# Patient Record
Sex: FEMALE | Race: WHITE | NOT HISPANIC OR LATINO | URBAN - METROPOLITAN AREA
[De-identification: names, ages, dates, MRNs, and addresses within clinical notes are randomized per-mention and may not be internally consistent; named-entity substitution may affect disease eponyms.]

---

## 2019-04-09 ENCOUNTER — OUTPATIENT (OUTPATIENT)
Dept: OUTPATIENT SERVICES | Facility: HOSPITAL | Age: 49
LOS: 1 days | Discharge: HOME | End: 2019-04-09

## 2019-04-09 DIAGNOSIS — G35 MULTIPLE SCLEROSIS: ICD-10-CM

## 2020-11-14 ENCOUNTER — TRANSCRIPTION ENCOUNTER (OUTPATIENT)
Age: 50
End: 2020-11-14

## 2020-11-29 ENCOUNTER — EMERGENCY (EMERGENCY)
Facility: HOSPITAL | Age: 50
LOS: 0 days | Discharge: HOME | End: 2020-11-29
Attending: EMERGENCY MEDICINE | Admitting: EMERGENCY MEDICINE
Payer: COMMERCIAL

## 2020-11-29 VITALS
SYSTOLIC BLOOD PRESSURE: 150 MMHG | DIASTOLIC BLOOD PRESSURE: 81 MMHG | HEART RATE: 82 BPM | TEMPERATURE: 97 F | WEIGHT: 160.06 LBS | HEIGHT: 66 IN | RESPIRATION RATE: 18 BRPM | OXYGEN SATURATION: 98 %

## 2020-11-29 DIAGNOSIS — S09.90XA UNSPECIFIED INJURY OF HEAD, INITIAL ENCOUNTER: ICD-10-CM
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
## 2020-11-29 DIAGNOSIS — S02.2XXA FRACTURE OF NASAL BONES, INITIAL ENCOUNTER FOR CLOSED FRACTURE: ICD-10-CM

## 2020-11-29 DIAGNOSIS — S00.01XA ABRASION OF SCALP, INITIAL ENCOUNTER: ICD-10-CM

## 2020-11-29 DIAGNOSIS — Z23 ENCOUNTER FOR IMMUNIZATION: ICD-10-CM
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
## 2020-11-29 DIAGNOSIS — R41.82 ALTERED MENTAL STATUS, UNSPECIFIED: ICD-10-CM

## 2020-11-29 DIAGNOSIS — Z79.899 OTHER LONG TERM (CURRENT) DRUG THERAPY: ICD-10-CM
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
## 2020-11-29 PROCEDURE — 21310: CPT

## 2020-11-29 PROCEDURE — 99284 EMERGENCY DEPT VISIT MOD MDM: CPT | Mod: 25

## 2020-11-29 PROCEDURE — 70160 X-RAY EXAM OF NASAL BONES: CPT | Mod: 26

## 2020-11-29 RX ORDER — TETANUS TOXOID, REDUCED DIPHTHERIA TOXOID AND ACELLULAR PERTUSSIS VACCINE, ADSORBED 5; 2.5; 8; 8; 2.5 [IU]/.5ML; [IU]/.5ML; UG/.5ML; UG/.5ML; UG/.5ML
0.5 SUSPENSION INTRAMUSCULAR ONCE
Refills: 0 | Status: COMPLETED | OUTPATIENT
Start: 2020-11-29 | End: 2020-11-29

## 2020-11-29 RX ORDER — SODIUM CHLORIDE 9 MG/ML
500 INJECTION INTRAMUSCULAR; INTRAVENOUS; SUBCUTANEOUS ONCE
Refills: 0 | Status: DISCONTINUED | OUTPATIENT
Start: 2020-11-29 | End: 2020-11-29

## 2020-11-29 RX ADMIN — TETANUS TOXOID, REDUCED DIPHTHERIA TOXOID AND ACELLULAR PERTUSSIS VACCINE, ADSORBED 0.5 MILLILITER(S): 5; 2.5; 8; 8; 2.5 SUSPENSION INTRAMUSCULAR at 12:50

## 2020-11-29 NOTE — ED PROVIDER NOTE - NSFOLLOWUPCLINICS_GEN_ALL_ED_FT
[facility]  ENT  [address], [address]nd [place] [address]  Phone: [phone]  Fax:   Follow Up Time:

## 2020-11-29 NOTE — ED PROVIDER NOTE - PROGRESS NOTE DETAILS
PA fellow note reviewed and agree, possible small nasal bone fracture, No deformity on exam. Dc'd home with ENT f/u, No CT needed

## 2020-11-29 NOTE — ED PROVIDER NOTE - OBJECTIVE STATEMENT
51 yo F pmh of MS presenting to the ED s/p mechanical fall 3 days ago sent in by [facility] for evaluation. Pt reports she had a few alcoholic beverages on Friday night and she tripped up her wooden steps landing directly on her face and has bruising to nose and under bilateral eyes. Pt also reports after that event she had another mechanical trip and fall landing on the right occipital region of her head and endorses she has a small open cut. Pt took Aleve this am, denies any active pain. Not on any anticoag. Pt denies LOC, neck pain, back pain, fever, chills, nausea, vomiting, abdominal pain, chest pain, sob, dizziness, blurry vision. 51 yo F pmh of MS presenting to the ED s/p mechanical fall 3 days ago sent in by [facility] for evaluation. Pt reports she had a few alcoholic beverages on Friday night and she tripped up her wooden steps landing directly on her face and has bruising to nose and under bilateral eyes. Pt also reports after that event she had another mechanical trip and fall landing on the right occipital region of her head and endorses she has a small open cut. Pt took Aleve this am, denies any active pain. Not on any anticoag. Pt denies LOC, headache, neck pain, back pain, fever, chills, nausea, vomiting, abdominal pain, chest pain, sob, dizziness, blurry vision.

## 2020-11-29 NOTE — ED PROVIDER NOTE - NS ED ROS FT
Constitutional: (-) fever (-) malaise (-) diaphoresis (-) chills (-) wt. loss (-) bodyaches   Eyes: (-) visual changes (-) eye pain   ENMT: (-) nasal or chest congestion (-) runny nose (-) sore throat (-) hoarseness  (-) hearing changes (-) ear pain (-) ear discharge or infections (-) neck pain (-) neck stiffness  Cardiac: (-) chest pain  (-) palpitations (-) syncope (-) edema  Respiratory: (-) cough (-) hemoptysis (-) history of asthma or COPD (-) SOB (-) OBRIEN  GI: (-) nausea (-) vomiting (-) diarrhea (-) abdominal pain (-) hematochezia (-) changes in appetite (-) hx of nephrolithiasis  : (-) dysuria (-) increased frequency  (-) hematuria (-) incontinence  MS: (-) back pain (-) myalgia (-) muscle weakness (-)  joint pain  Neuro: (-) headache (-) dizziness (-) numbness/tingling to extremities B/L (-) weakness (-) LOC (-) head injury (-) AMS (-) sadlde anesthesia (-) tremors  Skin: (-) rash (-) laceration  Psychiatric: (-) hallucinations (-) SI/HI (-) intoxication  GYN: (-) pelvic pain (-) abnormal bleeding (-) abnormal discharge or odor (-) hx of STDs (-) OCP use (-) hx of fibroids/cysts (-) hx of pregnancy (-) hx of mammo (>40)  LMP:  OB: (-)  (-) C/S (-) complications of delivery. GA:  Except as documented in the HPI, all other systems are negative. Constitutional: (-) fever (-) malaise (-) diaphoresis (-) chills (-) wt. loss (-) bodyaches   Eyes: (-) visual changes (-) eye pain   ENMT: (-) nasal or chest congestion (-) runny nose (-) sore throat (-) hoarseness  (-) hearing changes (-) ear pain (-) ear discharge or infections (-) neck pain (-) neck stiffness  Cardiac: (-) chest pain  (-) palpitations (-) syncope (-) edema  Respiratory: (-) cough (-) hemoptysis (-) SOB   GI: (-) nausea (-) vomiting (-) diarrhea (-) abdominal pain  MS: (-) back pain (-) myalgia (-) muscle weakness (-)  joint pain  Neuro: (-) headache (-) dizziness (-) numbness/tingling to extremities B/L (-) weakness (-) LOC (-) head injury (+) AMS   Skin: (-) rash (+) laceration  Psychiatric: (-) hallucinations (-) SI/HI (-) intoxication    Except as documented in the HPI, all other systems are negative.

## 2020-11-29 NOTE — ED ADULT NURSE NOTE - NSIMPLEMENTINTERV_GEN_ALL_ED
Implemented All Fall Risk Interventions:  [employer] to call system. Call bell, personal items and telephone within reach. Instruct patient to call for assistance. Room bathroom lighting operational. Non-slip footwear when patient is off stretcher. Physically safe environment: no spills, clutter or unnecessary equipment. Stretcher in lowest position, wheels locked, appropriate side rails in place. Provide visual cue, wrist band, yellow gown, etc. Monitor gait and stability. Monitor for mental status changes and reorient to person, place, and time. Review medications for side effects contributing to fall risk. Reinforce activity limits and safety measures with patient and family.

## 2020-11-29 NOTE — ED PROVIDER NOTE - PATIENT PORTAL LINK FT
You can access the FollowMyHealth Patient Portal offered by [facility] by registering at the following website: [email or web address]. By joining [facility]’s FollowMyHealth portal, you will also be able to view your health information using other applications (apps) compatible with our system.

## 2020-11-29 NOTE — ED PROVIDER NOTE - CLINICAL SUMMARY MEDICAL DECISION MAKING FREE TEXT BOX
50y female no blood thinners with nondisplaced nasal bone fx, no septal hematoma, perrla neuro intact, will d/c supportive care, ent/plastics f/u. Patient counseled regarding conditions which should prompt return.

## 2020-11-29 NOTE — ED PROVIDER NOTE - PHYSICAL EXAMINATION
GENERAL: Well-nourished, Well-developed. NAD.  HEAD: No visible palpable bumps or hematomas. No ecchymosis behind ears B/L. (+)small superficial scabbed healing abrasion right occipital region  Eyes: PERRLA, EOMI. No asymmetry. No nystagmus. No conjunctival injection. Non-icteric sclera.  ENMT: No pharyngeal erythema or exudates. Uvula midline. No oral lesions. No broken teeth. No septal hematoma. (+)swelling and bruising to nasal bridge, small superficial scabbed healing laceration. Nares patent.   Neck: Supple. No cervical midline TTP. No paraveretebral TTP to traps. FROM  CVS: No reproducible chest wall tenderness. Normal S1,S2. No murmurs appreciated on auscultation   RESP: No use of accessory muscles. Chest rise symmetrical with good expansion. Lungs clear to auscultation B/L. No wheezing, rales, or rhonchi auscultated.  GI: Soft, Nontender,Nondistended. No guarding or rebound tenderness. No CVAT B/L.  MSK: Extremities w/o deformity or ttp. No visible signs of trauma such as ecchymosis, erythema, or swelling. FROM of upper and lower extremities B/L. No midline spinal TTP. FROM of back with flexion and extension.  Skin: (+)small superficial scabbed healing abrasion right occipital region, no active bleeding.  (+) small superficial scabbed healing laceration to nasal bridge.  EXT: Radial and pedal pulses present B/L.   Neuro: AA&O x 3. Speaking in full sentences.Sensation grossly intact. Strength 5/5 B/L. Gait within normal limits.  Normal Finger to nose exam. Visual fields intact.

## 2020-11-29 NOTE — ED PROVIDER NOTE - NSFOLLOWUPINSTRUCTIONS_ED_ALL_ED_FT
Rest, Ice to nose, Motrin for pain. See ENT for follow up, Return to ED for worsening headache, dizziness, nausea and vomiting

## 2020-12-02 ENCOUNTER — TRANSCRIPTION ENCOUNTER (OUTPATIENT)
Age: 50
End: 2020-12-02

## 2021-04-22 ENCOUNTER — TRANSCRIPTION ENCOUNTER (OUTPATIENT)
Age: 51
End: 2021-04-22

## 2021-12-20 ENCOUNTER — TRANSCRIPTION ENCOUNTER (OUTPATIENT)
Age: 51
End: 2021-12-20

## 2021-12-23 ENCOUNTER — TRANSCRIPTION ENCOUNTER (OUTPATIENT)
Age: 51
End: 2021-12-23

## 2022-02-05 NOTE — ED PROVIDER NOTE - CHIEF COMPLAINT
The patient is a 50y Female complaining of fall. Mart-1 - Negative Histology Text: MART-1 staining demonstrates a normal density and pattern of melanocytes along the dermal-epidermal junction. The surgical margins are negative for tumor cells.

## 2022-06-23 ENCOUNTER — NON-APPOINTMENT (OUTPATIENT)
Age: 52
End: 2022-06-23

## 2022-07-05 ENCOUNTER — NON-APPOINTMENT (OUTPATIENT)
Age: 52
End: 2022-07-05

## 2022-07-31 ENCOUNTER — NON-APPOINTMENT (OUTPATIENT)
Age: 52
End: 2022-07-31

## 2022-09-21 ENCOUNTER — NON-APPOINTMENT (OUTPATIENT)
Age: 52
End: 2022-09-21

## 2022-09-26 ENCOUNTER — NON-APPOINTMENT (OUTPATIENT)
Age: 52
End: 2022-09-26

## 2023-01-17 ENCOUNTER — NON-APPOINTMENT (OUTPATIENT)
Age: 53
End: 2023-01-17

## 2023-06-08 ENCOUNTER — NON-APPOINTMENT (OUTPATIENT)
Age: 53
End: 2023-06-08

## 2023-06-13 ENCOUNTER — EMERGENCY (EMERGENCY)
Facility: HOSPITAL | Age: 53
LOS: 0 days | Discharge: HOME | End: 2023-06-13
Attending: EMERGENCY MEDICINE
Payer: COMMERCIAL

## 2023-06-13 VITALS
DIASTOLIC BLOOD PRESSURE: 83 MMHG | TEMPERATURE: 97 F | HEIGHT: 69 IN | WEIGHT: 225.09 LBS | SYSTOLIC BLOOD PRESSURE: 155 MMHG | RESPIRATION RATE: 19 BRPM | OXYGEN SATURATION: 98 % | HEART RATE: 63 BPM

## 2023-06-13 VITALS — SYSTOLIC BLOOD PRESSURE: 148 MMHG | DIASTOLIC BLOOD PRESSURE: 75 MMHG | HEART RATE: 65 BPM

## 2023-06-13 DIAGNOSIS — Z87.828 PERSONAL HISTORY OF OTHER (HEALED) PHYSICAL INJURY AND TRAUMA: ICD-10-CM

## 2023-06-13 DIAGNOSIS — I89.1 LYMPHANGITIS: ICD-10-CM

## 2023-06-13 DIAGNOSIS — F41.8 OTHER SPECIFIED ANXIETY DISORDERS: ICD-10-CM

## 2023-06-13 DIAGNOSIS — L53.9 ERYTHEMATOUS CONDITION, UNSPECIFIED: ICD-10-CM

## 2023-06-13 DIAGNOSIS — G35 MULTIPLE SCLEROSIS: ICD-10-CM

## 2023-06-13 LAB
ALBUMIN SERPL ELPH-MCNC: 4 G/DL — SIGNIFICANT CHANGE UP (ref 3.5–5.2)
ALP SERPL-CCNC: 115 U/L — SIGNIFICANT CHANGE UP (ref 30–115)
ALT FLD-CCNC: 52 U/L — HIGH (ref 0–41)
ANION GAP SERPL CALC-SCNC: 8 MMOL/L — SIGNIFICANT CHANGE UP (ref 7–14)
APTT BLD: 51.3 SEC — HIGH (ref 27–39.2)
AST SERPL-CCNC: 50 U/L — HIGH (ref 0–41)
BASOPHILS # BLD AUTO: 0.03 K/UL — SIGNIFICANT CHANGE UP (ref 0–0.2)
BASOPHILS NFR BLD AUTO: 0.5 % — SIGNIFICANT CHANGE UP (ref 0–1)
BILIRUB SERPL-MCNC: 0.6 MG/DL — SIGNIFICANT CHANGE UP (ref 0.2–1.2)
BUN SERPL-MCNC: 9 MG/DL — LOW (ref 10–20)
CALCIUM SERPL-MCNC: 9 MG/DL — SIGNIFICANT CHANGE UP (ref 8.4–10.5)
CHLORIDE SERPL-SCNC: 105 MMOL/L — SIGNIFICANT CHANGE UP (ref 98–110)
CO2 SERPL-SCNC: 25 MMOL/L — SIGNIFICANT CHANGE UP (ref 17–32)
CREAT SERPL-MCNC: 0.8 MG/DL — SIGNIFICANT CHANGE UP (ref 0.7–1.5)
EGFR: 88 ML/MIN/1.73M2 — SIGNIFICANT CHANGE UP
EOSINOPHIL # BLD AUTO: 0.1 K/UL — SIGNIFICANT CHANGE UP (ref 0–0.7)
EOSINOPHIL NFR BLD AUTO: 1.5 % — SIGNIFICANT CHANGE UP (ref 0–8)
GLUCOSE SERPL-MCNC: 100 MG/DL — HIGH (ref 70–99)
HCG SERPL QL: NEGATIVE — SIGNIFICANT CHANGE UP
HCT VFR BLD CALC: 47.1 % — HIGH (ref 37–47)
HGB BLD-MCNC: 15.3 G/DL — SIGNIFICANT CHANGE UP (ref 12–16)
IMM GRANULOCYTES NFR BLD AUTO: 0.6 % — HIGH (ref 0.1–0.3)
INR BLD: 1 RATIO — SIGNIFICANT CHANGE UP (ref 0.65–1.3)
LACTATE SERPL-SCNC: 0.8 MMOL/L — SIGNIFICANT CHANGE UP (ref 0.7–2)
LYMPHOCYTES # BLD AUTO: 1.98 K/UL — SIGNIFICANT CHANGE UP (ref 1.2–3.4)
LYMPHOCYTES # BLD AUTO: 30.1 % — SIGNIFICANT CHANGE UP (ref 20.5–51.1)
MCHC RBC-ENTMCNC: 31.7 PG — HIGH (ref 27–31)
MCHC RBC-ENTMCNC: 32.5 G/DL — SIGNIFICANT CHANGE UP (ref 32–37)
MCV RBC AUTO: 97.5 FL — SIGNIFICANT CHANGE UP (ref 81–99)
MONOCYTES # BLD AUTO: 0.3 K/UL — SIGNIFICANT CHANGE UP (ref 0.1–0.6)
MONOCYTES NFR BLD AUTO: 4.6 % — SIGNIFICANT CHANGE UP (ref 1.7–9.3)
NEUTROPHILS # BLD AUTO: 4.13 K/UL — SIGNIFICANT CHANGE UP (ref 1.4–6.5)
NEUTROPHILS NFR BLD AUTO: 62.7 % — SIGNIFICANT CHANGE UP (ref 42.2–75.2)
NRBC # BLD: 0 /100 WBCS — SIGNIFICANT CHANGE UP (ref 0–0)
PLATELET # BLD AUTO: 268 K/UL — SIGNIFICANT CHANGE UP (ref 130–400)
PMV BLD: 9 FL — SIGNIFICANT CHANGE UP (ref 7.4–10.4)
POTASSIUM SERPL-MCNC: 4.3 MMOL/L — SIGNIFICANT CHANGE UP (ref 3.5–5)
POTASSIUM SERPL-SCNC: 4.3 MMOL/L — SIGNIFICANT CHANGE UP (ref 3.5–5)
PROT SERPL-MCNC: 6.5 G/DL — SIGNIFICANT CHANGE UP (ref 6–8)
PROTHROM AB SERPL-ACNC: 11.4 SEC — SIGNIFICANT CHANGE UP (ref 9.95–12.87)
RBC # BLD: 4.83 M/UL — SIGNIFICANT CHANGE UP (ref 4.2–5.4)
RBC # FLD: 13.7 % — SIGNIFICANT CHANGE UP (ref 11.5–14.5)
SODIUM SERPL-SCNC: 138 MMOL/L — SIGNIFICANT CHANGE UP (ref 135–146)
WBC # BLD: 6.58 K/UL — SIGNIFICANT CHANGE UP (ref 4.8–10.8)
WBC # FLD AUTO: 6.58 K/UL — SIGNIFICANT CHANGE UP (ref 4.8–10.8)

## 2023-06-13 PROCEDURE — 85025 COMPLETE CBC W/AUTO DIFF WBC: CPT

## 2023-06-13 PROCEDURE — 36415 COLL VENOUS BLD VENIPUNCTURE: CPT

## 2023-06-13 PROCEDURE — 84703 CHORIONIC GONADOTROPIN ASSAY: CPT

## 2023-06-13 PROCEDURE — 93970 EXTREMITY STUDY: CPT

## 2023-06-13 PROCEDURE — 85610 PROTHROMBIN TIME: CPT

## 2023-06-13 PROCEDURE — 99284 EMERGENCY DEPT VISIT MOD MDM: CPT

## 2023-06-13 PROCEDURE — 83605 ASSAY OF LACTIC ACID: CPT

## 2023-06-13 PROCEDURE — 85730 THROMBOPLASTIN TIME PARTIAL: CPT

## 2023-06-13 PROCEDURE — 99284 EMERGENCY DEPT VISIT MOD MDM: CPT | Mod: 25

## 2023-06-13 PROCEDURE — 93970 EXTREMITY STUDY: CPT | Mod: 26

## 2023-06-13 PROCEDURE — 80053 COMPREHEN METABOLIC PANEL: CPT

## 2023-06-13 RX ORDER — ESCITALOPRAM OXALATE 10 MG/1
1 TABLET, FILM COATED ORAL
Qty: 0 | Refills: 0 | DISCHARGE

## 2023-06-13 RX ORDER — OCRELIZUMAB 300 MG/10ML
0 INJECTION INTRAVENOUS
Qty: 0 | Refills: 0 | DISCHARGE

## 2023-06-13 NOTE — ED PROVIDER NOTE - PHYSICAL EXAMINATION
VITAL SIGNS: I have reviewed nursing notes and confirm.  CONSTITUTIONAL: Well-developed; well-nourished; in no acute distress.   SKIN: Palpable cord to the left thigh with surrounding ecchymosis, tenderness to palpation over that affected area, no warmth or excessive redness, no fluctuance Remainder of skin exam is warm and dry, no acute rash.    HEAD: Normocephalic; atraumatic.  EYES: ; conjunctiva and sclera clear.  ENT: No nasal discharge; airway clear.  EXT: Normal ROM.  No clubbing, cyanosis or edema.   NEURO: Alert, oriented, grossly unremarkable

## 2023-06-13 NOTE — ED PROVIDER NOTE - ATTENDING APP SHARED VISIT CONTRIBUTION OF CARE
patient with exam more c/w thrombophlebitis though not supported by u/s (at least no clot), will d/c warm compress, switch antibiotic, pmd f/u. Patient counseled regarding conditions which should prompt return.

## 2023-06-13 NOTE — ED PROVIDER NOTE - PROGRESS NOTE DETAILS
Patient told to discontinue clindamycin and instead start taking Doxy which was sent to pharmacy.  Patient given strict return precautions.  Patient aware that Doxy makes her very sensitive to the sun and she should be careful.

## 2023-06-13 NOTE — ED ADULT NURSE NOTE - NSHOSCREENINGQ1_ED_ALL_ED
[General Appearance - Well Developed] : well developed [Normal Appearance] : normal appearance [Well Groomed] : well groomed [General Appearance - Well Nourished] : well nourished [No Deformities] : no deformities [General Appearance - In No Acute Distress] : no acute distress [Normal Jugular Venous V Waves Present] : normal jugular venous V waves present [Heart Rate And Rhythm] : heart rate and rhythm were normal [Heart Sounds] : normal S1 and S2 [Murmurs] : no murmurs present [Arterial Pulses Normal] : the arterial pulses were normal [Edema] : no peripheral edema present [Veins - Varicosity Changes] : no varicosital changes were noted in the lower extremities [Respiration, Rhythm And Depth] : normal respiratory rhythm and effort [Exaggerated Use Of Accessory Muscles For Inspiration] : no accessory muscle use [Auscultation Breath Sounds / Voice Sounds] : lungs were clear to auscultation bilaterally [Chest Palpation] : palpation of the chest revealed no abnormalities [Lungs Percussion] : the lungs were normal to percussion [Bowel Sounds] : normal bowel sounds [Abdomen Soft] : soft [Abdomen Tenderness] : non-tender [Abdomen Mass (___ Cm)] : no abdominal mass palpated [Abdomen Hernia] : no hernia was discovered [Abnormal Walk] : normal gait [Nail Clubbing] : no clubbing of the fingernails [Cyanosis, Localized] : no localized cyanosis [Petechial Hemorrhages (___cm)] : no petechial hemorrhages [Nail Splinter Hemorrhages] : no splinter hemorrhages of the nails [Skin Color & Pigmentation] : normal skin color and pigmentation No [Skin Turgor] : normal skin turgor [] : no rash [Oriented To Time, Place, And Person] : oriented to person, place, and time [Impaired Insight] : insight and judgment were intact [No Anxiety] : not feeling anxious [FreeTextEntry1] : S/P Rt BKA with prosthesis. Uses a cane.

## 2023-06-13 NOTE — ED ADULT NURSE NOTE - NSFALLUNIVINTERV_ED_ALL_ED
Bed/Stretcher in lowest position, wheels locked, appropriate side rails in place/Call bell, personal items and telephone in reach/Instruct patient to call for assistance before getting out of bed/chair/stretcher/Non-slip footwear applied when patient is off stretcher/[employer] to call system/Physically safe environment - no spills, clutter or unnecessary equipment/Purposeful proactive rounding/Room/bathroom lighting operational, light cord in reach

## 2023-06-13 NOTE — ED PROVIDER NOTE - NSFOLLOWUPINSTRUCTIONS_ED_ALL_ED_FT
Take antibiotics as discussed, be careful in the sun while taking this antibiotic.  Apply warm compress to area of redness

## 2023-06-13 NOTE — ED PROVIDER NOTE - OBJECTIVE STATEMENT
Patient is a 53-year-old female history of MS not on any current treatment, anxiety here for evaluation of left thigh redness after her dog accidentally scratched her leg last week.  Patient noticed some bruising to the area with pain was seen by urgent care few days ago and started on antibiotics for cellulitis, states pain is extending up her leg which prompted today's visit.  Patient denies fever, weakness, numbness

## 2023-06-13 NOTE — ED ADULT TRIAGE NOTE - CHIEF COMPLAINT QUOTE
Pt states " I went to urgent care on Friday 6/9 for cellulitis to my Left Leg. I was started on clinda 3x daily. The infection is spreading. I was told to come here if it worsened"

## 2023-06-13 NOTE — ED ADULT NURSE NOTE - NSFALLLASTSIX_ED_ALL_ED
cont. Wellbutrin
c/w levothyroxine 50mcg  -f/u AM TSH free T4- pending was not collected
c/w levothyroxine 50mcg  -f/u AM TSH free T4- pending was not collected
No.

## 2023-07-18 PROBLEM — F41.9 ANXIETY DISORDER, UNSPECIFIED: Chronic | Status: ACTIVE | Noted: 2023-06-13

## 2023-07-19 PROBLEM — Z00.00 ENCOUNTER FOR PREVENTIVE HEALTH EXAMINATION: Status: ACTIVE | Noted: 2023-07-19

## 2023-07-20 ENCOUNTER — APPOINTMENT (OUTPATIENT)
Dept: VASCULAR SURGERY | Facility: CLINIC | Age: 53
End: 2023-07-20
Payer: COMMERCIAL

## 2023-07-20 VITALS — DIASTOLIC BLOOD PRESSURE: 84 MMHG | SYSTOLIC BLOOD PRESSURE: 124 MMHG | HEART RATE: 69 BPM

## 2023-07-20 VITALS — HEIGHT: 69 IN | BODY MASS INDEX: 37.03 KG/M2 | WEIGHT: 250 LBS

## 2023-07-20 DIAGNOSIS — I83.893 VARICOSE VEINS OF BILATERAL LOWER EXTREMITIES WITH OTHER COMPLICATIONS: ICD-10-CM

## 2023-07-20 DIAGNOSIS — F41.9 ANXIETY DISORDER, UNSPECIFIED: ICD-10-CM

## 2023-07-20 DIAGNOSIS — Z87.891 PERSONAL HISTORY OF NICOTINE DEPENDENCE: ICD-10-CM

## 2023-07-20 DIAGNOSIS — Z78.9 OTHER SPECIFIED HEALTH STATUS: ICD-10-CM

## 2023-07-20 DIAGNOSIS — F32.A ANXIETY DISORDER, UNSPECIFIED: ICD-10-CM

## 2023-07-20 DIAGNOSIS — G35 MULTIPLE SCLEROSIS: ICD-10-CM

## 2023-07-20 PROCEDURE — 99204 OFFICE O/P NEW MOD 45 MIN: CPT

## 2023-07-20 PROCEDURE — 93970 EXTREMITY STUDY: CPT

## 2023-07-20 RX ORDER — ESCITALOPRAM OXALATE 20 MG/1
20 TABLET, FILM COATED ORAL
Refills: 0 | Status: ACTIVE | COMMUNITY

## 2023-07-20 RX ORDER — OMEGA-3/DHA/EPA/FISH OIL 300-1000MG
1000 CAPSULE ORAL
Refills: 0 | Status: ACTIVE | COMMUNITY

## 2023-07-20 RX ORDER — NICOTINE POLACRILEX 2 MG
1 GUM BUCCAL
Refills: 0 | Status: ACTIVE | COMMUNITY

## 2023-07-20 RX ORDER — ZINC SULFATE 50(220)MG
CAPSULE ORAL
Refills: 0 | Status: ACTIVE | COMMUNITY

## 2023-07-20 RX ORDER — UBIDECARENONE/VIT E ACET 100MG-5
CAPSULE ORAL
Refills: 0 | Status: ACTIVE | COMMUNITY

## 2023-07-20 NOTE — HISTORY OF PRESENT ILLNESS
[FreeTextEntry1] : 52 y/o F with h/o Multiple sclerosis, lower extremity varicose veins, recently seen at a vein clinic and diagnosed with phlebitis to left lower extremity, presents for evaluation of varicose veins. Has tried compression stockings in the past, but could not tolerate them.

## 2023-07-20 NOTE — DATA REVIEWED
[FreeTextEntry1] : I performed a venous duplex which was medically necessary to evaluate for venous reflux. It showed no evidence of DVT in the LE bilaterally, GSV positive for reflux bilaterally but short in length on left, phlebitis noted in the left thigh and calf varicosities, large incompetent varicosities bilaterally measuring 12 mm.\par

## 2023-07-20 NOTE — ASSESSMENT
[FreeTextEntry1] : 52 y/o F with symptomatic LE varicose veins, worse on RLE.\par \par She will require stab microphlebectomy for treatment, right leg first. Patient understands the risks of recurrence, bleeding, and infection.\par \par She is in agreement to proceed.\par

## 2023-12-12 ENCOUNTER — NON-APPOINTMENT (OUTPATIENT)
Age: 53
End: 2023-12-12

## 2024-05-29 ENCOUNTER — APPOINTMENT (OUTPATIENT)
Dept: ORTHOPEDIC SURGERY | Facility: CLINIC | Age: 54
End: 2024-05-29
Payer: SELF-PAY

## 2024-05-29 DIAGNOSIS — S80.01XA CONTUSION OF RIGHT KNEE, INITIAL ENCOUNTER: ICD-10-CM

## 2024-05-29 PROCEDURE — 99203 OFFICE O/P NEW LOW 30 MIN: CPT | Mod: ACP

## 2024-05-29 PROCEDURE — 73562 X-RAY EXAM OF KNEE 3: CPT | Mod: RT

## 2024-05-29 RX ORDER — MELOXICAM 15 MG/1
15 TABLET ORAL
Qty: 30 | Refills: 0 | Status: ACTIVE | COMMUNITY
Start: 2024-05-29 | End: 1900-01-01

## 2024-05-29 NOTE — DISCUSSION/SUMMARY
[de-identified] : Chief complaint: Right knee pain  HPI: Patient is a 54-year-old female presents the office today for the evaluation of right knee pain.  Patient reports that she was at work yesterday 5/28/2024 when she fell onto the right knee.  Since that time she has been experiencing pain to the right knee.  She is on sure of the orientation of the right knee upon the fall.  She reports having taken Aleve with no significant relief of her discomfort.  She reports applying ice to the right knee with some relief.  She denies any previous surgery or injury to the right knee.  She reports that she was able to walk immediately after the injury.  ROS: Positive for right knee pain  Physical examination of the right knee shows: No erythema  No ecchymosis  Tenderness to palpation over the anterior aspect of the knee as well as over the lateral aspect of the knee No appreciable effusion Able to perform active straight leg raise Knee flexion from 0-90 degrees Light touch is intact throughout mildly antalgic gait Negative Anterior Drawer No appreciable Instability with varus / valgus stress testing Calves are soft and nontender  3 views of the right knee performed in the office today show tricompartmental degenerative changes, no obvious acute displaced fracture, subluxation, or dislocation  Assessment/plan: Contusion of the right knee, discussed with the patient that knee contusions can take 4-6 weeks to heal, A prescription for meloxicam 15 mg once daily to be taken as needed with food was sent to the patient's pharmacy, confirmed no contraindications to NSAIDS. Patient denies being on a blood thinner. Denies history of GIB or GI ulcer.  Discussed in detail with the patient that they cannot take over-the-counter NSAIDs including but not limited to ibuprofen, Advil, Aleve, or Motrin while taking this medication.  They can continue to take over-the-counter Tylenol.  Patient can apply as needed ice or heat to the right knee with sensory precautions on an as-needed basis for pain, patient will be provided with a 3-week follow-up with SHAJI Webber, patient verbalized understanding of all findings in the office today, she agrees to follow-up as directed

## 2024-06-20 ENCOUNTER — APPOINTMENT (OUTPATIENT)
Dept: ORTHOPEDIC SURGERY | Facility: CLINIC | Age: 54
End: 2024-06-20
Payer: COMMERCIAL

## 2024-06-20 DIAGNOSIS — S80.00XD CONTUSION OF UNSPECIFIED KNEE, SUBSEQUENT ENCOUNTER: ICD-10-CM

## 2024-06-20 PROCEDURE — 99213 OFFICE O/P EST LOW 20 MIN: CPT

## 2024-06-20 NOTE — DISCUSSION/SUMMARY
[de-identified] : Right knee pain follow-up  HPI Patient 54-year-old female reports to office for subsequent reevaluation of right knee pain.  Pain has not improved.  Admits to knee swelling.  Walking, range of motion, and palpating certain areas aggravate the patient's pain.  Admits the knee clicks/gives out on her occasionally.  Denies any numbness or tingling.  Right knee exam is as follows: Mild effusion and infrapatellar bursitis noted.  No erythema or ecchymosis.  Able to perform active straight leg raise.  Knee flexion from 0 to 110 degrees with stiffness and pain.  Patellofemoral, anterior knee tenderness to palpation.  Calf is soft and nontender.  Positive Junior's.  Light touch intact throughout.  Mild antalgic gait.  Assessment/plan Explained contusion/bursitis and that she clinically may have a meniscal tear.  Right knee MRI ordered for further evaluation.  Patient was advised to call the office a few days after getting the MRI done to discuss results over the phone.   OTC NSAIDs as needed for pain.   The patient was advised to rest/ice the area and may alternate with warm compresses as needed. A script for physical therapy was printed for the patient so they can get started on that.    Follow-up in 2 months.  May consider injection at next visit if still symptomatic.  All questions/concerns were answered in detail.

## 2024-06-25 ENCOUNTER — NON-APPOINTMENT (OUTPATIENT)
Age: 54
End: 2024-06-25

## 2024-08-22 ENCOUNTER — APPOINTMENT (OUTPATIENT)
Dept: MRI IMAGING | Facility: CLINIC | Age: 54
End: 2024-08-22
Payer: COMMERCIAL

## 2024-08-22 PROCEDURE — 73721 MRI JNT OF LWR EXTRE W/O DYE: CPT | Mod: RT

## 2024-09-05 ENCOUNTER — APPOINTMENT (OUTPATIENT)
Dept: ORTHOPEDIC SURGERY | Facility: CLINIC | Age: 54
End: 2024-09-05
Payer: COMMERCIAL

## 2024-09-05 DIAGNOSIS — M17.11 UNILATERAL PRIMARY OSTEOARTHRITIS, RIGHT KNEE: ICD-10-CM

## 2024-09-05 DIAGNOSIS — S83.206A UNSPECIFIED TEAR OF UNSPECIFIED MENISCUS, CURRENT INJURY, RIGHT KNEE, INITIAL ENCOUNTER: ICD-10-CM

## 2024-09-05 PROCEDURE — 99213 OFFICE O/P EST LOW 20 MIN: CPT

## 2024-12-19 ENCOUNTER — APPOINTMENT (OUTPATIENT)
Dept: ORTHOPEDIC SURGERY | Facility: CLINIC | Age: 54
End: 2024-12-19

## 2025-05-14 ENCOUNTER — INPATIENT (INPATIENT)
Facility: HOSPITAL | Age: 55
LOS: 4 days | Discharge: HOME | DRG: 897 | End: 2025-05-19
Attending: STUDENT IN AN ORGANIZED HEALTH CARE EDUCATION/TRAINING PROGRAM | Admitting: STUDENT IN AN ORGANIZED HEALTH CARE EDUCATION/TRAINING PROGRAM
Payer: COMMERCIAL

## 2025-05-14 VITALS
HEIGHT: 69 IN | OXYGEN SATURATION: 97 % | DIASTOLIC BLOOD PRESSURE: 81 MMHG | RESPIRATION RATE: 20 BRPM | WEIGHT: 250 LBS | TEMPERATURE: 98 F | HEART RATE: 88 BPM | SYSTOLIC BLOOD PRESSURE: 131 MMHG

## 2025-05-14 DIAGNOSIS — R74.8 ABNORMAL LEVELS OF OTHER SERUM ENZYMES: ICD-10-CM

## 2025-05-14 DIAGNOSIS — Z98.891 HISTORY OF UTERINE SCAR FROM PREVIOUS SURGERY: Chronic | ICD-10-CM

## 2025-05-14 LAB
ALBUMIN SERPL ELPH-MCNC: 4.1 G/DL — SIGNIFICANT CHANGE UP (ref 3.5–5.2)
ALP SERPL-CCNC: 141 U/L — HIGH (ref 30–115)
ALT FLD-CCNC: 69 U/L — HIGH (ref 0–41)
ANION GAP SERPL CALC-SCNC: 12 MMOL/L — SIGNIFICANT CHANGE UP (ref 7–14)
ANION GAP SERPL CALC-SCNC: 21 MMOL/L — HIGH (ref 7–14)
AST SERPL-CCNC: 144 U/L — HIGH (ref 0–41)
BASOPHILS # BLD AUTO: 0.02 K/UL — SIGNIFICANT CHANGE UP (ref 0–0.2)
BASOPHILS # BLD AUTO: 0.03 K/UL — SIGNIFICANT CHANGE UP (ref 0–0.2)
BASOPHILS NFR BLD AUTO: 0.2 % — SIGNIFICANT CHANGE UP (ref 0–1)
BASOPHILS NFR BLD AUTO: 0.4 % — SIGNIFICANT CHANGE UP (ref 0–1)
BILIRUB DIRECT SERPL-MCNC: 0.9 MG/DL — HIGH (ref 0–0.3)
BILIRUB INDIRECT FLD-MCNC: 0.8 MG/DL — SIGNIFICANT CHANGE UP (ref 0.2–1.2)
BILIRUB SERPL-MCNC: 1.7 MG/DL — HIGH (ref 0.2–1.2)
BUN SERPL-MCNC: 8 MG/DL — LOW (ref 10–20)
BUN SERPL-MCNC: 9 MG/DL — LOW (ref 10–20)
CALCIUM SERPL-MCNC: 8.9 MG/DL — SIGNIFICANT CHANGE UP (ref 8.4–10.5)
CALCIUM SERPL-MCNC: 9.7 MG/DL — SIGNIFICANT CHANGE UP (ref 8.4–10.5)
CHLORIDE SERPL-SCNC: 91 MMOL/L — LOW (ref 98–110)
CHLORIDE SERPL-SCNC: 92 MMOL/L — LOW (ref 98–110)
CO2 SERPL-SCNC: 22 MMOL/L — SIGNIFICANT CHANGE UP (ref 17–32)
CO2 SERPL-SCNC: 25 MMOL/L — SIGNIFICANT CHANGE UP (ref 17–32)
CREAT SERPL-MCNC: 0.6 MG/DL — LOW (ref 0.7–1.5)
CREAT SERPL-MCNC: 0.7 MG/DL — SIGNIFICANT CHANGE UP (ref 0.7–1.5)
EGFR: 101 ML/MIN/1.73M2 — SIGNIFICANT CHANGE UP
EGFR: 101 ML/MIN/1.73M2 — SIGNIFICANT CHANGE UP
EGFR: 105 ML/MIN/1.73M2 — SIGNIFICANT CHANGE UP
EGFR: 105 ML/MIN/1.73M2 — SIGNIFICANT CHANGE UP
EOSINOPHIL # BLD AUTO: 0.02 K/UL — SIGNIFICANT CHANGE UP (ref 0–0.7)
EOSINOPHIL # BLD AUTO: 0.03 K/UL — SIGNIFICANT CHANGE UP (ref 0–0.7)
EOSINOPHIL NFR BLD AUTO: 0.3 % — SIGNIFICANT CHANGE UP (ref 0–8)
EOSINOPHIL NFR BLD AUTO: 0.3 % — SIGNIFICANT CHANGE UP (ref 0–8)
ETHANOL SERPL-MCNC: 59 MG/DL — HIGH
GLUCOSE SERPL-MCNC: 103 MG/DL — HIGH (ref 70–99)
GLUCOSE SERPL-MCNC: 112 MG/DL — HIGH (ref 70–99)
HCT VFR BLD CALC: 37.5 % — SIGNIFICANT CHANGE UP (ref 37–47)
HCT VFR BLD CALC: 42.5 % — SIGNIFICANT CHANGE UP (ref 37–47)
HGB BLD-MCNC: 13.4 G/DL — SIGNIFICANT CHANGE UP (ref 12–16)
HGB BLD-MCNC: 15.1 G/DL — SIGNIFICANT CHANGE UP (ref 12–16)
HYPOCHROMIA BLD QL: SLIGHT — SIGNIFICANT CHANGE UP
IMM GRANULOCYTES NFR BLD AUTO: 0.3 % — SIGNIFICANT CHANGE UP (ref 0.1–0.3)
IMM GRANULOCYTES NFR BLD AUTO: 0.4 % — HIGH (ref 0.1–0.3)
LYMPHOCYTES # BLD AUTO: 2.03 K/UL — SIGNIFICANT CHANGE UP (ref 1.2–3.4)
LYMPHOCYTES # BLD AUTO: 2.13 K/UL — SIGNIFICANT CHANGE UP (ref 1.2–3.4)
LYMPHOCYTES # BLD AUTO: 23.3 % — SIGNIFICANT CHANGE UP (ref 20.5–51.1)
LYMPHOCYTES # BLD AUTO: 27 % — SIGNIFICANT CHANGE UP (ref 20.5–51.1)
MACROCYTES BLD QL: SLIGHT — SIGNIFICANT CHANGE UP
MAGNESIUM SERPL-MCNC: 1.2 MG/DL — LOW (ref 1.8–2.4)
MCHC RBC-ENTMCNC: 35.5 G/DL — SIGNIFICANT CHANGE UP (ref 32–37)
MCHC RBC-ENTMCNC: 35.7 G/DL — SIGNIFICANT CHANGE UP (ref 32–37)
MCHC RBC-ENTMCNC: 39.3 PG — HIGH (ref 27–31)
MCHC RBC-ENTMCNC: 39.4 PG — HIGH (ref 27–31)
MCV RBC AUTO: 110 FL — HIGH (ref 81–99)
MCV RBC AUTO: 111 FL — HIGH (ref 81–99)
MONOCYTES # BLD AUTO: 0.37 K/UL — SIGNIFICANT CHANGE UP (ref 0.1–0.6)
MONOCYTES # BLD AUTO: 0.46 K/UL — SIGNIFICANT CHANGE UP (ref 0.1–0.6)
MONOCYTES NFR BLD AUTO: 4.7 % — SIGNIFICANT CHANGE UP (ref 1.7–9.3)
MONOCYTES NFR BLD AUTO: 5.3 % — SIGNIFICANT CHANGE UP (ref 1.7–9.3)
NEUTROPHILS # BLD AUTO: 5.3 K/UL — SIGNIFICANT CHANGE UP (ref 1.4–6.5)
NEUTROPHILS # BLD AUTO: 6.16 K/UL — SIGNIFICANT CHANGE UP (ref 1.4–6.5)
NEUTROPHILS NFR BLD AUTO: 67.2 % — SIGNIFICANT CHANGE UP (ref 42.2–75.2)
NEUTROPHILS NFR BLD AUTO: 70.6 % — SIGNIFICANT CHANGE UP (ref 42.2–75.2)
NRBC BLD AUTO-RTO: 0 /100 WBCS — SIGNIFICANT CHANGE UP (ref 0–0)
NRBC BLD AUTO-RTO: 0 /100 WBCS — SIGNIFICANT CHANGE UP (ref 0–0)
OVALOCYTES BLD QL SMEAR: SLIGHT — SIGNIFICANT CHANGE UP
PLAT MORPH BLD: NORMAL — SIGNIFICANT CHANGE UP
PLATELET # BLD AUTO: 131 K/UL — SIGNIFICANT CHANGE UP (ref 130–400)
PLATELET # BLD AUTO: 165 K/UL — SIGNIFICANT CHANGE UP (ref 130–400)
PLATELET COUNT - ESTIMATE: NORMAL — SIGNIFICANT CHANGE UP
PMV BLD: 8.9 FL — SIGNIFICANT CHANGE UP (ref 7.4–10.4)
PMV BLD: 9.6 FL — SIGNIFICANT CHANGE UP (ref 7.4–10.4)
POTASSIUM SERPL-MCNC: 4 MMOL/L — SIGNIFICANT CHANGE UP (ref 3.5–5)
POTASSIUM SERPL-MCNC: 4 MMOL/L — SIGNIFICANT CHANGE UP (ref 3.5–5)
POTASSIUM SERPL-SCNC: 4 MMOL/L — SIGNIFICANT CHANGE UP (ref 3.5–5)
POTASSIUM SERPL-SCNC: 4 MMOL/L — SIGNIFICANT CHANGE UP (ref 3.5–5)
PROT SERPL-MCNC: 6.3 G/DL — SIGNIFICANT CHANGE UP (ref 6–8)
RBC # BLD: 3.41 M/UL — LOW (ref 4.2–5.4)
RBC # BLD: 3.83 M/UL — LOW (ref 4.2–5.4)
RBC # FLD: 14.2 % — SIGNIFICANT CHANGE UP (ref 11.5–14.5)
RBC # FLD: 14.3 % — SIGNIFICANT CHANGE UP (ref 11.5–14.5)
RBC BLD AUTO: NORMAL — SIGNIFICANT CHANGE UP
SODIUM SERPL-SCNC: 129 MMOL/L — LOW (ref 135–146)
SODIUM SERPL-SCNC: 134 MMOL/L — LOW (ref 135–146)
STOMATOCYTES BLD QL SMEAR: SLIGHT — SIGNIFICANT CHANGE UP
WBC # BLD: 7.88 K/UL — SIGNIFICANT CHANGE UP (ref 4.8–10.8)
WBC # BLD: 8.73 K/UL — SIGNIFICANT CHANGE UP (ref 4.8–10.8)
WBC # FLD AUTO: 7.88 K/UL — SIGNIFICANT CHANGE UP (ref 4.8–10.8)
WBC # FLD AUTO: 8.73 K/UL — SIGNIFICANT CHANGE UP (ref 4.8–10.8)

## 2025-05-14 PROCEDURE — 81001 URINALYSIS AUTO W/SCOPE: CPT

## 2025-05-14 PROCEDURE — 80074 ACUTE HEPATITIS PANEL: CPT

## 2025-05-14 PROCEDURE — 80307 DRUG TEST PRSMV CHEM ANLYZR: CPT

## 2025-05-14 PROCEDURE — 80354 DRUG SCREENING FENTANYL: CPT

## 2025-05-14 PROCEDURE — 83735 ASSAY OF MAGNESIUM: CPT

## 2025-05-14 PROCEDURE — 82977 ASSAY OF GGT: CPT

## 2025-05-14 PROCEDURE — 85652 RBC SED RATE AUTOMATED: CPT

## 2025-05-14 PROCEDURE — 85025 COMPLETE CBC W/AUTO DIFF WBC: CPT

## 2025-05-14 PROCEDURE — 99285 EMERGENCY DEPT VISIT HI MDM: CPT

## 2025-05-14 PROCEDURE — 85027 COMPLETE CBC AUTOMATED: CPT

## 2025-05-14 PROCEDURE — 86038 ANTINUCLEAR ANTIBODIES: CPT

## 2025-05-14 PROCEDURE — 85610 PROTHROMBIN TIME: CPT

## 2025-05-14 PROCEDURE — 86140 C-REACTIVE PROTEIN: CPT

## 2025-05-14 PROCEDURE — 99222 1ST HOSP IP/OBS MODERATE 55: CPT

## 2025-05-14 PROCEDURE — 76705 ECHO EXAM OF ABDOMEN: CPT

## 2025-05-14 PROCEDURE — 80053 COMPREHEN METABOLIC PANEL: CPT

## 2025-05-14 PROCEDURE — 82140 ASSAY OF AMMONIA: CPT

## 2025-05-14 PROCEDURE — 82150 ASSAY OF AMYLASE: CPT

## 2025-05-14 PROCEDURE — 80048 BASIC METABOLIC PNL TOTAL CA: CPT

## 2025-05-14 PROCEDURE — 83036 HEMOGLOBIN GLYCOSYLATED A1C: CPT

## 2025-05-14 PROCEDURE — 36415 COLL VENOUS BLD VENIPUNCTURE: CPT

## 2025-05-14 PROCEDURE — 80346 BENZODIAZEPINES1-12: CPT

## 2025-05-14 RX ORDER — SODIUM CHLORIDE 9 G/1000ML
1000 INJECTION, SOLUTION INTRAVENOUS ONCE
Refills: 0 | Status: COMPLETED | OUTPATIENT
Start: 2025-05-14 | End: 2025-05-14

## 2025-05-14 RX ORDER — MAGNESIUM SULFATE 500 MG/ML
4 SYRINGE (ML) INJECTION ONCE
Refills: 0 | Status: COMPLETED | OUTPATIENT
Start: 2025-05-14 | End: 2025-05-14

## 2025-05-14 RX ORDER — CYST/ALA/Q10/PHOS.SER/DHA/BROC 100-20-50
1 POWDER (GRAM) ORAL DAILY
Refills: 0 | Status: DISCONTINUED | OUTPATIENT
Start: 2025-05-14 | End: 2025-05-19

## 2025-05-14 RX ORDER — LORAZEPAM 4 MG/ML
VIAL (ML) INJECTION
Refills: 0 | Status: COMPLETED | OUTPATIENT
Start: 2025-05-14 | End: 2025-05-18

## 2025-05-14 RX ORDER — ONDANSETRON HCL/PF 4 MG/2 ML
4 VIAL (ML) INJECTION EVERY 8 HOURS
Refills: 0 | Status: DISCONTINUED | OUTPATIENT
Start: 2025-05-14 | End: 2025-05-19

## 2025-05-14 RX ORDER — IBUPROFEN 200 MG
400 TABLET ORAL EVERY 6 HOURS
Refills: 0 | Status: DISCONTINUED | OUTPATIENT
Start: 2025-05-14 | End: 2025-05-19

## 2025-05-14 RX ORDER — MELATONIN 5 MG
3 TABLET ORAL AT BEDTIME
Refills: 0 | Status: DISCONTINUED | OUTPATIENT
Start: 2025-05-14 | End: 2025-05-19

## 2025-05-14 RX ORDER — CHLORDIAZEPOXIDE HCL 10 MG
50 CAPSULE ORAL ONCE
Refills: 0 | Status: DISCONTINUED | OUTPATIENT
Start: 2025-05-14 | End: 2025-05-14

## 2025-05-14 RX ORDER — LORAZEPAM 4 MG/ML
1.5 VIAL (ML) INJECTION EVERY 4 HOURS
Refills: 0 | Status: DISCONTINUED | OUTPATIENT
Start: 2025-05-15 | End: 2025-05-16

## 2025-05-14 RX ORDER — BISMUTH SUBSALICYLATE 262 MG/1
30 TABLET, CHEWABLE ORAL EVERY 6 HOURS
Refills: 0 | Status: DISCONTINUED | OUTPATIENT
Start: 2025-05-14 | End: 2025-05-19

## 2025-05-14 RX ORDER — LORAZEPAM 4 MG/ML
2 VIAL (ML) INJECTION EVERY 4 HOURS
Refills: 0 | Status: DISCONTINUED | OUTPATIENT
Start: 2025-05-14 | End: 2025-05-19

## 2025-05-14 RX ORDER — LORAZEPAM 4 MG/ML
1 VIAL (ML) INJECTION EVERY 4 HOURS
Refills: 0 | Status: DISCONTINUED | OUTPATIENT
Start: 2025-05-16 | End: 2025-05-17

## 2025-05-14 RX ORDER — ONDANSETRON HCL/PF 4 MG/2 ML
4 VIAL (ML) INJECTION EVERY 6 HOURS
Refills: 0 | Status: DISCONTINUED | OUTPATIENT
Start: 2025-05-14 | End: 2025-05-19

## 2025-05-14 RX ORDER — LORAZEPAM 4 MG/ML
0.5 VIAL (ML) INJECTION EVERY 4 HOURS
Refills: 0 | Status: DISCONTINUED | OUTPATIENT
Start: 2025-05-17 | End: 2025-05-18

## 2025-05-14 RX ORDER — ESCITALOPRAM OXALATE 20 MG/1
20 TABLET ORAL DAILY
Refills: 0 | Status: DISCONTINUED | OUTPATIENT
Start: 2025-05-14 | End: 2025-05-19

## 2025-05-14 RX ORDER — LORAZEPAM 4 MG/ML
2 VIAL (ML) INJECTION EVERY 4 HOURS
Refills: 0 | Status: DISCONTINUED | OUTPATIENT
Start: 2025-05-14 | End: 2025-05-15

## 2025-05-14 RX ORDER — HYDROXYZINE HYDROCHLORIDE 25 MG/1
50 TABLET, FILM COATED ORAL EVERY 6 HOURS
Refills: 0 | Status: DISCONTINUED | OUTPATIENT
Start: 2025-05-14 | End: 2025-05-19

## 2025-05-14 RX ORDER — FOLIC ACID 1 MG/1
1 TABLET ORAL DAILY
Refills: 0 | Status: DISCONTINUED | OUTPATIENT
Start: 2025-05-14 | End: 2025-05-19

## 2025-05-14 RX ORDER — SODIUM CHLORIDE 9 G/1000ML
1000 INJECTION, SOLUTION INTRAVENOUS
Refills: 0 | Status: DISCONTINUED | OUTPATIENT
Start: 2025-05-14 | End: 2025-05-19

## 2025-05-14 RX ORDER — HYDROXYZINE HYDROCHLORIDE 25 MG/1
100 TABLET, FILM COATED ORAL AT BEDTIME
Refills: 0 | Status: DISCONTINUED | OUTPATIENT
Start: 2025-05-14 | End: 2025-05-19

## 2025-05-14 RX ORDER — MAGNESIUM HYDROXIDE 400 MG/5ML
30 SUSPENSION ORAL ONCE
Refills: 0 | Status: DISCONTINUED | OUTPATIENT
Start: 2025-05-14 | End: 2025-05-19

## 2025-05-14 RX ORDER — ENOXAPARIN SODIUM 100 MG/ML
40 INJECTION SUBCUTANEOUS EVERY 24 HOURS
Refills: 0 | Status: DISCONTINUED | OUTPATIENT
Start: 2025-05-14 | End: 2025-05-19

## 2025-05-14 RX ORDER — MAGNESIUM, ALUMINUM HYDROXIDE 200-200 MG
30 TABLET,CHEWABLE ORAL EVERY 4 HOURS
Refills: 0 | Status: DISCONTINUED | OUTPATIENT
Start: 2025-05-14 | End: 2025-05-19

## 2025-05-14 RX ADMIN — Medication 2 MILLIGRAM(S): at 21:03

## 2025-05-14 RX ADMIN — Medication 50 MILLIGRAM(S): at 17:45

## 2025-05-14 RX ADMIN — SODIUM CHLORIDE 1000 MILLILITER(S): 9 INJECTION, SOLUTION INTRAVENOUS at 16:24

## 2025-05-14 RX ADMIN — SODIUM CHLORIDE 80 MILLILITER(S): 9 INJECTION, SOLUTION INTRAVENOUS at 21:02

## 2025-05-14 RX ADMIN — Medication 105 MILLIGRAM(S): at 17:09

## 2025-05-14 RX ADMIN — Medication 25 GRAM(S): at 17:22

## 2025-05-14 NOTE — H&P ADULT - NS ATTEND AMEND GEN_ALL_CORE FT
56 y/o female PMHx of MS, and alcohol dependence for almost two years.  Patient states she intermittently drinks 1/2 pint vodka and has increased dependence  w/ daily drinking for approx 2 months.   Patient states over past two weeks has developed tremors, denies black outs and denies w/d seizures.     # ETOH Dependence  - s/p Librium in ED  - ativan protocol  - Atarax PRN  - Thiamine & Folate   - Addiction medicine consult  - Hepatic function panel in am  - repeat CMP   - Long term rehab     # Hypomagnesemia  - IV Mg replaced in ED  - repeat Mg level at 2200  - Repeat Mg in am  - continue PO Mg     #MS, stable  - asymptomatic  - OP f/u  - Not currently on DMARDs

## 2025-05-14 NOTE — ED PROVIDER NOTE - PHYSICAL EXAMINATION
VITAL SIGNS: I have reviewed nursing notes and confirm.  CONSTITUTIONAL: chronically ill-appearing  SKIN: skin exam is warm and dry, no acute rash.    HEAD: Normocephalic; atraumatic.  EYES conjunctiva and sclera clear.  ENT: No nasal discharge; airway clear.  CARD: S1, S2 normal; no murmurs, gallops, or rubs. Regular rate and rhythm.   RESP: No wheezes, rales or rhonchi.  ABD: Normal bowel sounds; soft; non-distended; non-tender  EXT: Normal ROM.  No clubbing, cyanosis or edema.   NEURO: Alert, oriented, grossly unremarkable

## 2025-05-14 NOTE — ED PROVIDER NOTE - CLINICAL SUMMARY MEDICAL DECISION MAKING FREE TEXT BOX
We obtained labs.  Patient does have elevated LFTs, likely with alcohol related.  Magnesium was found to be low.  Repletion started in the ED.  While patient was in the ED she started to feel shaky and required a dose of Librium.  Will admit at this time.

## 2025-05-14 NOTE — ED ADULT TRIAGE NOTE - CHIEF COMPLAINT QUOTE
pt in er requesting detox from alcohol. pt stated she has been abusing alcohol for years, has been getting worse in the last few months  pts last drink 130 pm, drank vodka. has been drinking 3 drinks of vodka daily  pt complaining of dehydration and no appetite, pt denies SI/HI

## 2025-05-14 NOTE — ED ADULT NURSE NOTE - CCCP TRG CHIEF CMPLNT
detoxification Elidel Counseling: Patient may experience a mild burning sensation during topical application. Elidel is not approved in children less than 2 years of age. There have been case reports of hematologic and skin malignancies in patients using topical calcineurin inhibitors although causality is questionable.

## 2025-05-14 NOTE — ED PROVIDER NOTE - ATTENDING APP SHARED VISIT CONTRIBUTION OF CARE
55-year-old female past medical history of multiple sclerosis, alcohol abuse, presents for evaluation after drinking every day for the past 2 months.  Patient last drink was this afternoon.  Here requesting detox.  Patient states over the past few weeks she has been feeling shaky at times, no chest pain or shortness of breath, on exam patient NAD, AO x 3, no signs of trauma

## 2025-05-14 NOTE — ED PROVIDER NOTE - OBJECTIVE STATEMENT
Pt is a 56y/o female pmhx if MS, alsoholism, daily drinking for approx 2 months, last drink (Vodka) 130pm today here requesting detox. Pt denies withdrawal seizures, drug use, cp, sob, abd pain, n/v/d, SI/HI

## 2025-05-14 NOTE — ED PROVIDER NOTE - CARE PLAN
Principal Discharge DX:	Alcoholism  Secondary Diagnosis:	Hypomagnesemia  Secondary Diagnosis:	Abnormal transaminases   1

## 2025-05-14 NOTE — H&P ADULT - NSHPLABSRESULTS_GEN_ALL_CORE
15.1   8.73  )-----------( 165      ( 14 May 2025 16:05 )             42.5       05-14    134[L]  |  91[L]  |  8[L]  ----------------------------<  112[H]  4.0   |  22  |  0.7    Ca    9.7      14 May 2025 16:05  Mg     1.2     05-14    TPro  6.3  /  Alb  4.1  /  TBili  1.7[H]  /  DBili  0.9[H]  /  AST  144[H]  /  ALT  69[H]  /  AlkPhos  141[H]  05-14            Urinalysis Basic - ( 14 May 2025 16:05 )    Color: x / Appearance: x / SG: x / pH: x  Gluc: 112 mg/dL / Ketone: x  / Bili: x / Urobili: x   Blood: x / Protein: x / Nitrite: x   Leuk Esterase: x / RBC: x / WBC x   Sq Epi: x / Non Sq Epi: x / Bacteria: x

## 2025-05-14 NOTE — H&P ADULT - HISTORY OF PRESENT ILLNESS
Pt is a 54 y/o female PMHx of MS, and alcohol dependence for almost two years.  Patient states she intermittently drinks 1/2 pint vodka and has increased dependence  w/ daily drinking for approx 2 months.   Patient states over past two weeks has developed tremors, denies black outs and denies w/d seizures. Pt c/o nausea for past month with no vomiting and decreased PO intake. Pt states no other complaints. Patient states  last drink (Vodka) 130pm today here requesting detox. Pt denies drug use, Chest Pain, SOB, abd pain, denies SI/HI.

## 2025-05-14 NOTE — H&P ADULT - ASSESSMENT
Pt is a 54 y/o female PMHx of MS, and alcohol dependence for almost two years.  Patient states she intermittently drinks 1/2 pint vodka and has increased dependence  w/ daily drinking for approx 2 months.   Patient states over past two weeks has developed tremors, denies black outs and denies w/d seizures.         # ETOH Dependence  - s/p Librium in ED  - Librium protocol  - Atarax PRN  - Thiamine & Folate   - Addiction medicine consult  - Hepatic function panel in am  - repeat CMP   - Long term rehab     # Hypomagnesemia  - IV Mg replaced in ED  - repeat Mg level at 2200  - Repeat Mg in am  - continue PO Mg     # MS  - no specific follow up  - patient needs outpatient Neurology follow up

## 2025-05-14 NOTE — PATIENT PROFILE ADULT - HAVE YOU BEEN EATING POORLY BECAUSE OF A DECREASED APPETITE?
Established Patient    Chief Complaint   Patient presents with    Sleep Problem     Sleeping issues     HISTORY OF PRESENT ILLNESS:     Mr. Mason Craig is our 46 year old male patient with a past medical history that is significant for crohn's disease, hypertension, generalized anxiety disorder, major depressive disorder who presents to our outpatient clinic today for routine follow up.    Patient has not yet been able to establish with Psychiatry, but Psychiatry Urgent referral has been ordered previously; was told he is on the waiting list. No alarm symptoms today; no SI/HI.    Of note, patient self-continued Paroxetine 10mg daily after self-discontinuing Paroxetine at our prior visit. Initial plan was to keep off Paroxetine. Prescribing patient Paroxetine 5mg daily; to be taken for 3 weeks; 4th week to take every other day; 5th week to discontinue entirely. Advised patient he should not be self titrating his medication regimen because of risks and potential complications.    Keeping patient on Trazadone 150mg, with close follow up in 1 month to review labs and see how patient is doing with downtitrating SSRI; at that time, consider decreasing dose. Patient reluctant today because mentions lower dosages provided minimal symptom relief in the past.    Past Medical History:   Diagnosis Date    ADHD     Crohn's disease (HCC)     Hypertension     krones        Patient Active Problem List    Diagnosis Date Noted    Preventative health care 11/06/2023    Hypomania (HCC) 08/18/2023    Screening for metabolic disorder 08/15/2023    Erectile dysfunction 08/15/2023    Insomnia 12/05/2022    Encounter for routine adult health examination 12/05/2022    SHEY (generalized anxiety disorder) 08/20/2021    Crohn's disease (HCC) 11/04/2020    Essential hypertension 11/04/2020       Allergies:Patient has no known allergies.    Current Outpatient Medications   Medication Sig Dispense Refill    PARoxetine (PAXIL) 10 MG Tab Take 1  Tablet by mouth every day. Dose was changed from 20mg to 5mg. Patient should be taking 5mg daily. 15 Tablet 1    traZODone (DESYREL) 150 MG Tab TAKE 1 TABLET BY MOUTH AT BEDTIME 60 Tablet 0    busPIRone (BUSPAR) 10 MG Tab tablet TAKE 1/2 (ONE-HALF) TABLET BY MOUTH TWICE DAILY FOR 7 DAYS THEN TAKE 1 TABLET TWICE DAILY 90 Tablet 3    lisinopril-hydrochlorothiazide (PRINZIDE) 20-25 MG per tablet Take 1 tablet by mouth once daily 30 Tablet 3    cyclobenzaprine (FLEXERIL) 5 mg tablet Take 1 Tablet by mouth 2 times a day as needed for Muscle Spasms or Moderate Pain. 30 Tablet 0    Blood Pressure Monitoring (ADULT BLOOD PRESSURE CUFF LG) Kit 1 Kit 2 times a day as needed (Check Blood Pressure). (Patient not taking: Reported on 12/19/2023) 1 Kit 0     No current facility-administered medications for this visit.       Social History     Tobacco Use    Smoking status: Never    Smokeless tobacco: Never   Vaping Use    Vaping Use: Never used   Substance Use Topics    Alcohol use: Yes     Comment: occ     Drug use: No       Family History   Problem Relation Age of Onset    No Known Problems Mother     No Known Problems Father     Cancer Neg Hx     Diabetes Neg Hx     Hypertension Neg Hx     Hyperlipidemia Neg Hx     Stroke Neg Hx     Heart Disease Neg Hx          Review of Systems   Constitutional:  Negative for chills, fever and malaise/fatigue.   HENT: Negative.     Eyes: Negative.    Respiratory:  Negative for cough, hemoptysis, sputum production and shortness of breath.    Cardiovascular:  Negative for chest pain, palpitations and orthopnea.   Gastrointestinal:  Negative for abdominal pain, constipation, diarrhea, heartburn, nausea and vomiting.   Genitourinary:  Negative for dysuria, frequency and urgency.   Skin:  Negative for itching and rash.   Psychiatric/Behavioral:  Negative for depression, hallucinations, substance abuse and suicidal ideas. The patient is nervous/anxious and has insomnia.        Exam:  BP (!) 139/91  "(BP Location: Left arm, Patient Position: Sitting, BP Cuff Size: Large adult)   Pulse 86   Temp 36.1 °C (96.9 °F) (Temporal)   Ht 1.93 m (6' 4\")   Wt 114 kg (252 lb 6.4 oz)   SpO2 91%  Body mass index is 30.72 kg/m².    Constitutional:  Not in acute distress, well appearing.  HEENT:   Normocephalic, atraumatic.  Cardiovascular: S1, S2; Regular rate and rhythm; No murmurs/rubs/gallops.  Lungs:   Clear to auscultation bilaterally; No wheezes/rhales/rhonchi; No respiratory distress.  Abdomen: Soft, nondistended, not tender to palpation; no guarding no rigidity; no masses.  Extremities:  No cyanosis/clubbing/edema; No obvious deformities.  Skin:  Warm and dry.  No visible rashes.  Neurologic: Alert Awake & Oriented x 3, CN II-XII grossly intact; strength and sensation grossly intact.  No focal deficits noted.  Psychiatric:  Pressured speech, tangential    Assessment/Plan:   Mr. Mason Craig is our 46 year old male patient with a past medical history that is significant for crohn's disease, hypertension, generalized anxiety disorder, major depressive disorder who presents to our outpatient clinic today for routine follow up.    Screening for metabolic disorder  -Encouraged patient to please have his outpatient lab work done for review on subsequent clinic appointment    Hypomania (HCC)  -Still with pressured speech, racing thoughts, insomnia  -Improved tangential speech since decreased, then discontinued Paroxetine  -Continuing with Buspar 10mg BID  -Continuing with Trazadone 150mg qhs; consider downtitrating at subsequent visit once weaned off of SSRI and evaluating for symptom improvement  -Patient self-resumed Paroxetine 10mg daily; at this time, downtitrating to 5mg daily for 3 weeks, 4th week alternating days, 5th week off completely  -Pending appointment to establish with Psychiatry with urgent referral  -No alarm symptoms; denies SI/HI    Essential hypertension  -Blood pressure today 139/91  -Self reportedly " adherent to Lisinopril-HCTX 20-25mg  -Patient has not taken his pill yet today; takes at night; switching to mornings  -No slurred speech, facial droop, or pronator drift on exam; denies any chest pain, lightheadedness, or dizziness  -Monitor on subsequent clinic visit     All imaging results and lab results and consult notes are reviewed at this visit.  Followup: Return in about 4 weeks (around 1/16/2024).    Javier Bethea MD  PGY-3 Internal Medicine Resident   No (0)

## 2025-05-14 NOTE — H&P ADULT - NSHPPHYSICALEXAM_GEN_ALL_CORE
GENERAL:  56y/o Female NAD, resting comfortably.  HEAD:  Atraumatic, Normocephalic  EYES: EOMI, PERRLA, injected conjunctiva and  watery sclera clear  NECK: Supple, No JVD, no cervical lymphadenopathy, non-tender  CHEST/LUNG: Clear to auscultation bilaterally; No wheeze, rhonchi, or rales  HEART: Regular rate and rhythm; S1&S2  ABDOMEN: Soft, Nontender, Nondistended x 4 quadrants; Bowel sounds present  EXTREMITIES:   Peripheral Pulses Present, No clubbing, no cyanosis, or no edema, no calf tenderness  PSYCH: AAOx3, cooperative, appropriate  NEUROLOGY: WNL  SKIN: WNL

## 2025-05-14 NOTE — PATIENT PROFILE ADULT - FALL HARM RISK - RISK INTERVENTIONS

## 2025-05-15 DIAGNOSIS — F10.20 ALCOHOL DEPENDENCE, UNCOMPLICATED: ICD-10-CM

## 2025-05-15 LAB
A1C WITH ESTIMATED AVERAGE GLUCOSE RESULT: 4.9 % — SIGNIFICANT CHANGE UP (ref 4–5.6)
ALBUMIN SERPL ELPH-MCNC: 3.3 G/DL — LOW (ref 3.5–5.2)
ALP SERPL-CCNC: 108 U/L — SIGNIFICANT CHANGE UP (ref 30–115)
ALT FLD-CCNC: 51 U/L — HIGH (ref 0–41)
AMMONIA BLD-MCNC: 33 UMOL/L — SIGNIFICANT CHANGE UP (ref 11–55)
AMYLASE P1 CFR SERPL: 27 U/L — SIGNIFICANT CHANGE UP (ref 25–115)
ANION GAP SERPL CALC-SCNC: 11 MMOL/L — SIGNIFICANT CHANGE UP (ref 7–14)
AST SERPL-CCNC: 88 U/L — HIGH (ref 0–41)
BASOPHILS # BLD AUTO: 0.02 K/UL — SIGNIFICANT CHANGE UP (ref 0–0.2)
BASOPHILS NFR BLD AUTO: 0.4 % — SIGNIFICANT CHANGE UP (ref 0–1)
BILIRUB SERPL-MCNC: 1.8 MG/DL — HIGH (ref 0.2–1.2)
BUN SERPL-MCNC: 10 MG/DL — SIGNIFICANT CHANGE UP (ref 10–20)
CALCIUM SERPL-MCNC: 8.8 MG/DL — SIGNIFICANT CHANGE UP (ref 8.4–10.5)
CHLORIDE SERPL-SCNC: 98 MMOL/L — SIGNIFICANT CHANGE UP (ref 98–110)
CO2 SERPL-SCNC: 26 MMOL/L — SIGNIFICANT CHANGE UP (ref 17–32)
CREAT SERPL-MCNC: 0.6 MG/DL — LOW (ref 0.7–1.5)
EGFR: 105 ML/MIN/1.73M2 — SIGNIFICANT CHANGE UP
EGFR: 105 ML/MIN/1.73M2 — SIGNIFICANT CHANGE UP
EOSINOPHIL # BLD AUTO: 0.1 K/UL — SIGNIFICANT CHANGE UP (ref 0–0.7)
EOSINOPHIL NFR BLD AUTO: 2.1 % — SIGNIFICANT CHANGE UP (ref 0–8)
ESTIMATED AVERAGE GLUCOSE: 94 MG/DL — SIGNIFICANT CHANGE UP (ref 68–114)
GGT SERPL-CCNC: 216 U/L — HIGH (ref 1–40)
GLUCOSE SERPL-MCNC: 88 MG/DL — SIGNIFICANT CHANGE UP (ref 70–99)
HCT VFR BLD CALC: 37.7 % — SIGNIFICANT CHANGE UP (ref 37–47)
HGB BLD-MCNC: 13.2 G/DL — SIGNIFICANT CHANGE UP (ref 12–16)
IMM GRANULOCYTES NFR BLD AUTO: 0.2 % — SIGNIFICANT CHANGE UP (ref 0.1–0.3)
INR BLD: 0.95 RATIO — SIGNIFICANT CHANGE UP (ref 0.65–1.3)
LYMPHOCYTES # BLD AUTO: 1.2 K/UL — SIGNIFICANT CHANGE UP (ref 1.2–3.4)
LYMPHOCYTES # BLD AUTO: 24.7 % — SIGNIFICANT CHANGE UP (ref 20.5–51.1)
MAGNESIUM SERPL-MCNC: 2.1 MG/DL — SIGNIFICANT CHANGE UP (ref 1.8–2.4)
MCHC RBC-ENTMCNC: 35 G/DL — SIGNIFICANT CHANGE UP (ref 32–37)
MCHC RBC-ENTMCNC: 39.3 PG — HIGH (ref 27–31)
MCV RBC AUTO: 112.2 FL — HIGH (ref 81–99)
MONOCYTES # BLD AUTO: 0.23 K/UL — SIGNIFICANT CHANGE UP (ref 0.1–0.6)
MONOCYTES NFR BLD AUTO: 4.7 % — SIGNIFICANT CHANGE UP (ref 1.7–9.3)
NEUTROPHILS # BLD AUTO: 3.29 K/UL — SIGNIFICANT CHANGE UP (ref 1.4–6.5)
NEUTROPHILS NFR BLD AUTO: 67.9 % — SIGNIFICANT CHANGE UP (ref 42.2–75.2)
NRBC BLD AUTO-RTO: 0 /100 WBCS — SIGNIFICANT CHANGE UP (ref 0–0)
PLATELET # BLD AUTO: 114 K/UL — LOW (ref 130–400)
PMV BLD: 9.7 FL — SIGNIFICANT CHANGE UP (ref 7.4–10.4)
POTASSIUM SERPL-MCNC: 4 MMOL/L — SIGNIFICANT CHANGE UP (ref 3.5–5)
POTASSIUM SERPL-SCNC: 4 MMOL/L — SIGNIFICANT CHANGE UP (ref 3.5–5)
PROT SERPL-MCNC: 4.9 G/DL — LOW (ref 6–8)
PROTHROM AB SERPL-ACNC: 11.2 SEC — SIGNIFICANT CHANGE UP (ref 9.95–12.87)
RBC # BLD: 3.36 M/UL — LOW (ref 4.2–5.4)
RBC # FLD: 14.4 % — SIGNIFICANT CHANGE UP (ref 11.5–14.5)
SODIUM SERPL-SCNC: 135 MMOL/L — SIGNIFICANT CHANGE UP (ref 135–146)
WBC # BLD: 4.85 K/UL — SIGNIFICANT CHANGE UP (ref 4.8–10.8)
WBC # FLD AUTO: 4.85 K/UL — SIGNIFICANT CHANGE UP (ref 4.8–10.8)

## 2025-05-15 PROCEDURE — 99232 SBSQ HOSP IP/OBS MODERATE 35: CPT

## 2025-05-15 PROCEDURE — 99221 1ST HOSP IP/OBS SF/LOW 40: CPT

## 2025-05-15 RX ADMIN — Medication 1.5 MILLIGRAM(S): at 21:03

## 2025-05-15 RX ADMIN — Medication 2 MILLIGRAM(S): at 17:48

## 2025-05-15 RX ADMIN — Medication 100 MILLIGRAM(S): at 13:15

## 2025-05-15 RX ADMIN — Medication 1 TABLET(S): at 13:16

## 2025-05-15 RX ADMIN — Medication 2 MILLIGRAM(S): at 13:15

## 2025-05-15 RX ADMIN — Medication 2 MILLIGRAM(S): at 05:01

## 2025-05-15 RX ADMIN — ESCITALOPRAM OXALATE 20 MILLIGRAM(S): 20 TABLET ORAL at 13:15

## 2025-05-15 RX ADMIN — Medication 2 MILLIGRAM(S): at 02:04

## 2025-05-15 RX ADMIN — FOLIC ACID 1 MILLIGRAM(S): 1 TABLET ORAL at 13:15

## 2025-05-15 NOTE — CONSULT NOTE ADULT - SUBJECTIVE AND OBJECTIVE BOX
Pt is a 54 y/o female PMHx of MS, and alcohol dependence for almost two years.  Patient states she intermittently drinks 1/2 pint vodka and has increased dependence  w/ daily drinking for approx 2 months.   Patient states over past two weeks has developed tremors, denies black outs and denies w/d seizures. Pt c/o nausea for past month with no vomiting and decreased PO intake. Pt states no other complaints. Patient states  last drink (Vodka) 130pm today here requesting detox. Pt denies drug use, Chest Pain, SOB, abd pain, denies SI/HI    Pt interviewed, examined and EMR chart reviewed.  Pt admits to drinking 1/2 quart x  2  months. Last Drink day of admission   Hx of withdrawal tremors denies seizures  variable periods of sobriety in the past.  Has been in detox before ___X__yes,   _____No        REVIEW OF SYSTEMS:    Constitutional: No fever, weight loss or fatigue  ENT:  No difficulty hearing, tinnitus, vertigo; No sinus or throat pain  Neck: No pain or stiffness  Respiratory: No cough, wheezing, chills or hemoptysis  Cardiovascular: No chest pain, palpitations, shortness of breath, dizziness or leg swelling  Gastrointestinal: No abdominal or epigastric pain. No nausea, vomiting or hematemesis; No diarrhea or constipation. No melena or hematochezia.  Neurological: No headaches, memory loss, loss of strength, numbness or tremors  Musculoskeletal: No joint pain or swelling; No muscle, back or extremity pain  Psychiatric: No depression, anxiety, mood swings or difficulty sleeping    MEDICATIONS  (STANDING):  enoxaparin Injectable 40 milliGRAM(s) SubCutaneous every 24 hours  escitalopram 20 milliGRAM(s) Oral daily  folic acid 1 milliGRAM(s) Oral daily  lactated ringers. 1000 milliLiter(s) (80 mL/Hr) IV Continuous <Continuous>  LORazepam     Tablet   Oral   LORazepam     Tablet 2 milliGRAM(s) Oral every 4 hours  LORazepam     Tablet 1.5 milliGRAM(s) Oral every 4 hours  multivitamin/minerals 1 Tablet(s) Oral daily  thiamine 100 milliGRAM(s) Oral daily    MEDICATIONS  (PRN):  aluminum hydroxide/magnesium hydroxide/simethicone Suspension 30 milliLiter(s) Oral every 4 hours PRN Dyspepsia  bismuth subsalicylate Liquid 30 milliLiter(s) Oral every 6 hours PRN Diarrhea  hydrOXYzine hydrochloride 50 milliGRAM(s) Oral every 6 hours PRN Anxiety  hydrOXYzine hydrochloride 100 milliGRAM(s) Oral at bedtime PRN insomnia  ibuprofen  Tablet. 400 milliGRAM(s) Oral every 6 hours PRN Mild Pain (1 - 3)  LORazepam     Tablet 2 milliGRAM(s) Oral every 4 hours PRN Alcohol withdrawal symptoms  magnesium hydroxide Suspension 30 milliLiter(s) Oral once PRN Constipation  melatonin 3 milliGRAM(s) Oral at bedtime PRN Insomnia  ondansetron   Disintegrating Tablet 4 milliGRAM(s) Oral every 6 hours PRN Nausea and/or Vomiting  ondansetron Injectable 4 milliGRAM(s) IV Push every 8 hours PRN Nausea and/or Vomiting      Vital Signs Last 24 Hrs  T(C): 36.7 (15 May 2025 04:37), Max: 36.8 (14 May 2025 18:33)  T(F): 98.1 (15 May 2025 04:37), Max: 98.3 (14 May 2025 18:33)  HR: 94 (15 May 2025 04:37) (76 - 94)  BP: 130/83 (15 May 2025 04:37) (114/69 - 135/79)  BP(mean): --  RR: 18 (15 May 2025 04:37) (18 - 20)  SpO2: 98% (15 May 2025 04:37) (97% - 100%)    Parameters below as of 15 May 2025 04:37  Patient On (Oxygen Delivery Method): room air        PHYSICAL EXAM:    Constitutional: NAD,   HEENT: EOMI, Normal Hearing,  Respiratory: CTAB/L  Cardiovascular: RR  Gastrointestinal: soft, NT/ND  Extremities: No peripheral edema  Neurological: A/O x 3, no focal deficits    LABS:                        13.2   4.85  )-----------( 114      ( 15 May 2025 07:37 )             37.7     05-15    135  |  98  |  10  ----------------------------<  88  4.0   |  26  |  0.6[L]    Ca    8.8      15 May 2025 07:37  Mg     2.1     05-15    TPro  4.9[L]  /  Alb  3.3[L]  /  TBili  1.8[H]  /  DBili  x   /  AST  88[H]  /  ALT  51[H]  /  AlkPhos  108  05-15    PT/INR - ( 15 May 2025 07:37 )   PT: 11.20 sec;   INR: 0.95 ratio           Urinalysis Basic - ( 15 May 2025 07:37 )    Color: x / Appearance: x / SG: x / pH: x  Gluc: 88 mg/dL / Ketone: x  / Bili: x / Urobili: x   Blood: x / Protein: x / Nitrite: x   Leuk Esterase: x / RBC: x / WBC x   Sq Epi: x / Non Sq Epi: x / Bacteria: x      Drug Screen Urine:  Alcohol Level  Alcohol, Blood: 59 mg/dL (05-14-25 @ 16:05)        RADIOLOGY & ADDITIONAL STUDIES:

## 2025-05-15 NOTE — CONSULT NOTE ADULT - PROBLEM SELECTOR RECOMMENDATION 9
After evaluation at this time protocol .....Pt should be on Thiamine and Folic acid. Pt will be monitored and supportive care provided.  Obtain UDS if not done already.  Use of Vistaril for anxiety.  Consider adding gabapentin for anxiety standing order and follow up with PMD/Program for continuation of treatment.    Abdominal ultrasound AFP every 6 months for HCC screening  -EGD outpatient for esophageal varices screening   -Follow up with Private GI or our [facility] located at [address] [place] [address]. Phone Number: [phone]  -Alcohol counseling provided   CATCH team involved for aftercare and pt will follow up with aftercare

## 2025-05-15 NOTE — PROGRESS NOTE ADULT - ASSESSMENT
56 y/o female PMHx of MS, and alcohol dependence for almost two years.  Patient states she intermittently drinks 1/2 pint vodka and has increased dependence  w/ daily drinking for approx 2 months.   Patient states over past two weeks has developed tremors, denies black outs and denies w/d seizures.     # ETOH Dependence  - s/p Librium in ED  - ativan protocol  - Atarax PRN  - Thiamine & Folate   - Addiction medicine following  - UDS  - Long term rehab     # Hypomagnesemia  - IV Mg replaced in ED  - repeat Mg level at 2200  - Repeat Mg in am  - continue PO Mg     #MS, stable  - asymptomatic  - OP f/u  - Not currently on DMARDs.

## 2025-05-16 LAB
ALBUMIN SERPL ELPH-MCNC: 3.1 G/DL — LOW (ref 3.5–5.2)
ALP SERPL-CCNC: 104 U/L — SIGNIFICANT CHANGE UP (ref 30–115)
ALT FLD-CCNC: 62 U/L — HIGH (ref 0–41)
ANION GAP SERPL CALC-SCNC: 11 MMOL/L — SIGNIFICANT CHANGE UP (ref 7–14)
APPEARANCE UR: ABNORMAL
AST SERPL-CCNC: 145 U/L — HIGH (ref 0–41)
BASOPHILS # BLD AUTO: 0.02 K/UL — SIGNIFICANT CHANGE UP (ref 0–0.2)
BASOPHILS NFR BLD AUTO: 0.5 % — SIGNIFICANT CHANGE UP (ref 0–1)
BILIRUB SERPL-MCNC: 1 MG/DL — SIGNIFICANT CHANGE UP (ref 0.2–1.2)
BILIRUB UR-MCNC: ABNORMAL
BUN SERPL-MCNC: 10 MG/DL — SIGNIFICANT CHANGE UP (ref 10–20)
CALCIUM SERPL-MCNC: 8.3 MG/DL — LOW (ref 8.4–10.5)
CHLORIDE SERPL-SCNC: 102 MMOL/L — SIGNIFICANT CHANGE UP (ref 98–110)
CO2 SERPL-SCNC: 26 MMOL/L — SIGNIFICANT CHANGE UP (ref 17–32)
COLOR SPEC: SIGNIFICANT CHANGE UP
CREAT SERPL-MCNC: 0.5 MG/DL — LOW (ref 0.7–1.5)
DIFF PNL FLD: NEGATIVE — SIGNIFICANT CHANGE UP
DRUG SCREEN 1, URINE RESULT: SIGNIFICANT CHANGE UP
EGFR: 110 ML/MIN/1.73M2 — SIGNIFICANT CHANGE UP
EGFR: 110 ML/MIN/1.73M2 — SIGNIFICANT CHANGE UP
EOSINOPHIL # BLD AUTO: 0.1 K/UL — SIGNIFICANT CHANGE UP (ref 0–0.7)
EOSINOPHIL NFR BLD AUTO: 2.3 % — SIGNIFICANT CHANGE UP (ref 0–8)
GLUCOSE SERPL-MCNC: 95 MG/DL — SIGNIFICANT CHANGE UP (ref 70–99)
GLUCOSE UR QL: NEGATIVE MG/DL — SIGNIFICANT CHANGE UP
HAV IGM SER-ACNC: SIGNIFICANT CHANGE UP
HBV CORE IGM SER-ACNC: SIGNIFICANT CHANGE UP
HBV SURFACE AG SER-ACNC: SIGNIFICANT CHANGE UP
HCT VFR BLD CALC: 37 % — SIGNIFICANT CHANGE UP (ref 37–47)
HCV AB S/CO SERPL IA: 0.08 S/CO — SIGNIFICANT CHANGE UP (ref 0–0.79)
HCV AB SERPL-IMP: SIGNIFICANT CHANGE UP
HGB BLD-MCNC: 12.6 G/DL — SIGNIFICANT CHANGE UP (ref 12–16)
IMM GRANULOCYTES NFR BLD AUTO: 0.2 % — SIGNIFICANT CHANGE UP (ref 0.1–0.3)
KETONES UR QL: NEGATIVE MG/DL — SIGNIFICANT CHANGE UP
LEUKOCYTE ESTERASE UR-ACNC: ABNORMAL
LYMPHOCYTES # BLD AUTO: 1.51 K/UL — SIGNIFICANT CHANGE UP (ref 1.2–3.4)
LYMPHOCYTES # BLD AUTO: 34.6 % — SIGNIFICANT CHANGE UP (ref 20.5–51.1)
MAGNESIUM SERPL-MCNC: 1.6 MG/DL — LOW (ref 1.8–2.4)
MCHC RBC-ENTMCNC: 34.1 G/DL — SIGNIFICANT CHANGE UP (ref 32–37)
MCHC RBC-ENTMCNC: 39 PG — HIGH (ref 27–31)
MCV RBC AUTO: 114.6 FL — HIGH (ref 81–99)
MONOCYTES # BLD AUTO: 0.18 K/UL — SIGNIFICANT CHANGE UP (ref 0.1–0.6)
MONOCYTES NFR BLD AUTO: 4.1 % — SIGNIFICANT CHANGE UP (ref 1.7–9.3)
NEUTROPHILS # BLD AUTO: 2.54 K/UL — SIGNIFICANT CHANGE UP (ref 1.4–6.5)
NEUTROPHILS NFR BLD AUTO: 58.3 % — SIGNIFICANT CHANGE UP (ref 42.2–75.2)
NITRITE UR-MCNC: NEGATIVE — SIGNIFICANT CHANGE UP
NRBC BLD AUTO-RTO: 0 /100 WBCS — SIGNIFICANT CHANGE UP (ref 0–0)
PH UR: 7 — SIGNIFICANT CHANGE UP (ref 5–8)
PLATELET # BLD AUTO: 96 K/UL — LOW (ref 130–400)
PMV BLD: 9.4 FL — SIGNIFICANT CHANGE UP (ref 7.4–10.4)
POTASSIUM SERPL-MCNC: 4.2 MMOL/L — SIGNIFICANT CHANGE UP (ref 3.5–5)
POTASSIUM SERPL-SCNC: 4.2 MMOL/L — SIGNIFICANT CHANGE UP (ref 3.5–5)
PROT SERPL-MCNC: 4.8 G/DL — LOW (ref 6–8)
PROT UR-MCNC: NEGATIVE MG/DL — SIGNIFICANT CHANGE UP
RBC # BLD: 3.23 M/UL — LOW (ref 4.2–5.4)
RBC # FLD: 14.4 % — SIGNIFICANT CHANGE UP (ref 11.5–14.5)
SODIUM SERPL-SCNC: 139 MMOL/L — SIGNIFICANT CHANGE UP (ref 135–146)
SP GR SPEC: 1.01 — SIGNIFICANT CHANGE UP (ref 1–1.03)
UROBILINOGEN FLD QL: 2 MG/DL (ref 0.2–1)
WBC # BLD: 4.36 K/UL — LOW (ref 4.8–10.8)
WBC # FLD AUTO: 4.36 K/UL — LOW (ref 4.8–10.8)

## 2025-05-16 PROCEDURE — 99232 SBSQ HOSP IP/OBS MODERATE 35: CPT

## 2025-05-16 PROCEDURE — 99231 SBSQ HOSP IP/OBS SF/LOW 25: CPT

## 2025-05-16 RX ADMIN — Medication 1.5 MILLIGRAM(S): at 13:11

## 2025-05-16 RX ADMIN — ESCITALOPRAM OXALATE 20 MILLIGRAM(S): 20 TABLET ORAL at 13:12

## 2025-05-16 RX ADMIN — SODIUM CHLORIDE 80 MILLILITER(S): 9 INJECTION, SOLUTION INTRAVENOUS at 21:25

## 2025-05-16 RX ADMIN — Medication 1.5 MILLIGRAM(S): at 05:10

## 2025-05-16 RX ADMIN — FOLIC ACID 1 MILLIGRAM(S): 1 TABLET ORAL at 13:12

## 2025-05-16 RX ADMIN — Medication 100 MILLIGRAM(S): at 13:12

## 2025-05-16 RX ADMIN — Medication 1.5 MILLIGRAM(S): at 17:26

## 2025-05-16 RX ADMIN — Medication 1 MILLIGRAM(S): at 21:25

## 2025-05-16 RX ADMIN — Medication 1.5 MILLIGRAM(S): at 09:52

## 2025-05-16 RX ADMIN — Medication 1 TABLET(S): at 13:11

## 2025-05-16 NOTE — PROGRESS NOTE ADULT - ASSESSMENT
56 y/o female PMHx of MS, and alcohol dependence for almost two years.  Patient states she intermittently drinks 1/2 pint vodka and has increased dependence  w/ daily drinking for approx 2 months.   Patient states over past two weeks has developed tremors, denies black outs and denies w/d seizures.     # ETOH Dependence  - s/p Librium in ED  - ativan protocol  - Atarax PRN  - Thiamine & Folate   - Addiction medicine following  - Long term rehab     # Hypomagnesemia  - IV Mg replaced in ED  - repeat Mg level at 2200  - continue PO Mg     #MS, stable  - asymptomatic  - OP f/u  - Not currently on DMARDs.

## 2025-05-17 LAB
ALBUMIN SERPL ELPH-MCNC: 3.1 G/DL — LOW (ref 3.5–5.2)
ALP SERPL-CCNC: 113 U/L — SIGNIFICANT CHANGE UP (ref 30–115)
ALT FLD-CCNC: 110 U/L — HIGH (ref 0–41)
ANION GAP SERPL CALC-SCNC: 14 MMOL/L — SIGNIFICANT CHANGE UP (ref 7–14)
AST SERPL-CCNC: 246 U/L — HIGH (ref 0–41)
BASOPHILS # BLD AUTO: 0.03 K/UL — SIGNIFICANT CHANGE UP (ref 0–0.2)
BASOPHILS NFR BLD AUTO: 0.7 % — SIGNIFICANT CHANGE UP (ref 0–1)
BILIRUB SERPL-MCNC: 0.8 MG/DL — SIGNIFICANT CHANGE UP (ref 0.2–1.2)
BUN SERPL-MCNC: 6 MG/DL — LOW (ref 10–20)
CALCIUM SERPL-MCNC: 9.1 MG/DL — SIGNIFICANT CHANGE UP (ref 8.4–10.5)
CHLORIDE SERPL-SCNC: 103 MMOL/L — SIGNIFICANT CHANGE UP (ref 98–110)
CO2 SERPL-SCNC: 23 MMOL/L — SIGNIFICANT CHANGE UP (ref 17–32)
CREAT SERPL-MCNC: 0.6 MG/DL — LOW (ref 0.7–1.5)
CRP SERPL-MCNC: 11.3 MG/L — HIGH
EGFR: 105 ML/MIN/1.73M2 — SIGNIFICANT CHANGE UP
EGFR: 105 ML/MIN/1.73M2 — SIGNIFICANT CHANGE UP
EOSINOPHIL # BLD AUTO: 0.16 K/UL — SIGNIFICANT CHANGE UP (ref 0–0.7)
EOSINOPHIL NFR BLD AUTO: 3.7 % — SIGNIFICANT CHANGE UP (ref 0–8)
ERYTHROCYTE [SEDIMENTATION RATE] IN BLOOD: 9 MM/HR — SIGNIFICANT CHANGE UP (ref 0–20)
GLUCOSE SERPL-MCNC: 123 MG/DL — HIGH (ref 70–99)
HCT VFR BLD CALC: 38.9 % — SIGNIFICANT CHANGE UP (ref 37–47)
HGB BLD-MCNC: 13.2 G/DL — SIGNIFICANT CHANGE UP (ref 12–16)
IMM GRANULOCYTES NFR BLD AUTO: 0.7 % — HIGH (ref 0.1–0.3)
LYMPHOCYTES # BLD AUTO: 1.67 K/UL — SIGNIFICANT CHANGE UP (ref 1.2–3.4)
LYMPHOCYTES # BLD AUTO: 38.6 % — SIGNIFICANT CHANGE UP (ref 20.5–51.1)
MAGNESIUM SERPL-MCNC: 1.7 MG/DL — LOW (ref 1.8–2.4)
MCHC RBC-ENTMCNC: 33.9 G/DL — SIGNIFICANT CHANGE UP (ref 32–37)
MCHC RBC-ENTMCNC: 39.3 PG — HIGH (ref 27–31)
MCV RBC AUTO: 115.8 FL — HIGH (ref 81–99)
MONOCYTES # BLD AUTO: 0.23 K/UL — SIGNIFICANT CHANGE UP (ref 0.1–0.6)
MONOCYTES NFR BLD AUTO: 5.3 % — SIGNIFICANT CHANGE UP (ref 1.7–9.3)
NEUTROPHILS # BLD AUTO: 2.21 K/UL — SIGNIFICANT CHANGE UP (ref 1.4–6.5)
NEUTROPHILS NFR BLD AUTO: 51 % — SIGNIFICANT CHANGE UP (ref 42.2–75.2)
NRBC BLD AUTO-RTO: 0 /100 WBCS — SIGNIFICANT CHANGE UP (ref 0–0)
PLATELET # BLD AUTO: 111 K/UL — LOW (ref 130–400)
PMV BLD: 8.7 FL — SIGNIFICANT CHANGE UP (ref 7.4–10.4)
POTASSIUM SERPL-MCNC: 4 MMOL/L — SIGNIFICANT CHANGE UP (ref 3.5–5)
POTASSIUM SERPL-SCNC: 4 MMOL/L — SIGNIFICANT CHANGE UP (ref 3.5–5)
PROT SERPL-MCNC: 5.2 G/DL — LOW (ref 6–8)
RBC # BLD: 3.36 M/UL — LOW (ref 4.2–5.4)
RBC # FLD: 14.3 % — SIGNIFICANT CHANGE UP (ref 11.5–14.5)
SODIUM SERPL-SCNC: 140 MMOL/L — SIGNIFICANT CHANGE UP (ref 135–146)
WBC # BLD: 4.33 K/UL — LOW (ref 4.8–10.8)
WBC # FLD AUTO: 4.33 K/UL — LOW (ref 4.8–10.8)

## 2025-05-17 PROCEDURE — 76705 ECHO EXAM OF ABDOMEN: CPT | Mod: 26

## 2025-05-17 PROCEDURE — 99232 SBSQ HOSP IP/OBS MODERATE 35: CPT

## 2025-05-17 RX ADMIN — Medication 100 MILLIGRAM(S): at 11:09

## 2025-05-17 RX ADMIN — Medication 1 MILLIGRAM(S): at 05:34

## 2025-05-17 RX ADMIN — Medication 1 MILLIGRAM(S): at 01:53

## 2025-05-17 RX ADMIN — HYDROXYZINE HYDROCHLORIDE 100 MILLIGRAM(S): 25 TABLET, FILM COATED ORAL at 21:15

## 2025-05-17 RX ADMIN — Medication 3 MILLIGRAM(S): at 21:15

## 2025-05-17 RX ADMIN — ESCITALOPRAM OXALATE 20 MILLIGRAM(S): 20 TABLET ORAL at 11:09

## 2025-05-17 RX ADMIN — Medication 1 MILLIGRAM(S): at 13:45

## 2025-05-17 RX ADMIN — Medication 400 MILLIGRAM(S): at 15:03

## 2025-05-17 RX ADMIN — Medication 1 MILLIGRAM(S): at 17:01

## 2025-05-17 RX ADMIN — Medication 400 MILLIGRAM(S): at 17:09

## 2025-05-17 RX ADMIN — FOLIC ACID 1 MILLIGRAM(S): 1 TABLET ORAL at 11:09

## 2025-05-17 RX ADMIN — Medication 1 TABLET(S): at 11:09

## 2025-05-17 RX ADMIN — Medication 1 MILLIGRAM(S): at 09:53

## 2025-05-17 RX ADMIN — Medication 0.5 MILLIGRAM(S): at 21:15

## 2025-05-17 NOTE — PROGRESS NOTE ADULT - ASSESSMENT
54 y/o female PMHx of MS, and alcohol dependence for almost two years.  Patient states she intermittently drinks 1/2 pint vodka and has increased dependence  w/ daily drinking for approx 2 months.   Patient states over past two weeks has developed tremors, denies black outs and denies w/d seizures.     # ETOH Dependence  - s/p Librium in ED  - ativan protocol  - Atarax PRN  - Thiamine & Folate   - Addiction medicine following  - Long term rehab     # Hypomagnesemia  - IV Mg replaced in ED  - repeat Mg level at 2200  - continue PO Mg     #MS, stable  - asymptomatic  - OP f/u  - Not currently on DMARDs.     54 y/o female PMHx of MS, and alcohol dependence for almost two years.  Patient states she intermittently drinks 1/2 pint vodka and has increased dependence  w/ daily drinking for approx 2 months.   Patient states over past two weeks has developed tremors, denies black outs and denies w/d seizures.     # ETOH Dependence  - s/p Librium in ED  - ativan protocol  - Atarax PRN  - Thiamine & Folate   - Addiction medicine following  - Long term rehab     #Transaminitis, worsening  - Hepatocellular pattern  - Hepatitis panel negative  - RUQ US  - Avoid hepatotoxic agents  - GI consult    # Hypomagnesemia  - IV Mg replaced in ED  - repeat Mg level at 2200  - continue PO Mg     #MS, stable  - asymptomatic  - OP f/u  - Not currently on DMARDs    Handoff: completes detox taper 5/18, GI consult for transaminitis

## 2025-05-18 LAB
ALBUMIN SERPL ELPH-MCNC: 3.5 G/DL — SIGNIFICANT CHANGE UP (ref 3.5–5.2)
ALP SERPL-CCNC: 122 U/L — HIGH (ref 30–115)
ALT FLD-CCNC: 150 U/L — HIGH (ref 0–41)
ANION GAP SERPL CALC-SCNC: 15 MMOL/L — HIGH (ref 7–14)
AST SERPL-CCNC: 286 U/L — HIGH (ref 0–41)
BILIRUB SERPL-MCNC: 0.6 MG/DL — SIGNIFICANT CHANGE UP (ref 0.2–1.2)
BUN SERPL-MCNC: 6 MG/DL — LOW (ref 10–20)
CALCIUM SERPL-MCNC: 9.3 MG/DL — SIGNIFICANT CHANGE UP (ref 8.4–10.5)
CHLORIDE SERPL-SCNC: 107 MMOL/L — SIGNIFICANT CHANGE UP (ref 98–110)
CO2 SERPL-SCNC: 19 MMOL/L — SIGNIFICANT CHANGE UP (ref 17–32)
CREAT SERPL-MCNC: 0.7 MG/DL — SIGNIFICANT CHANGE UP (ref 0.7–1.5)
EGFR: 101 ML/MIN/1.73M2 — SIGNIFICANT CHANGE UP
EGFR: 101 ML/MIN/1.73M2 — SIGNIFICANT CHANGE UP
GLUCOSE SERPL-MCNC: 98 MG/DL — SIGNIFICANT CHANGE UP (ref 70–99)
HCT VFR BLD CALC: 42.2 % — SIGNIFICANT CHANGE UP (ref 37–47)
HGB BLD-MCNC: 14.2 G/DL — SIGNIFICANT CHANGE UP (ref 12–16)
MCHC RBC-ENTMCNC: 33.6 G/DL — SIGNIFICANT CHANGE UP (ref 32–37)
MCHC RBC-ENTMCNC: 38.6 PG — HIGH (ref 27–31)
MCV RBC AUTO: 114.7 FL — HIGH (ref 81–99)
NRBC BLD AUTO-RTO: 0 /100 WBCS — SIGNIFICANT CHANGE UP (ref 0–0)
PLATELET # BLD AUTO: 98 K/UL — LOW (ref 130–400)
PMV BLD: 9.9 FL — SIGNIFICANT CHANGE UP (ref 7.4–10.4)
POTASSIUM SERPL-MCNC: 5.2 MMOL/L — HIGH (ref 3.5–5)
POTASSIUM SERPL-SCNC: 5.2 MMOL/L — HIGH (ref 3.5–5)
PROT SERPL-MCNC: 6.1 G/DL — SIGNIFICANT CHANGE UP (ref 6–8)
RBC # BLD: 3.68 M/UL — LOW (ref 4.2–5.4)
RBC # FLD: 14.4 % — SIGNIFICANT CHANGE UP (ref 11.5–14.5)
SODIUM SERPL-SCNC: 141 MMOL/L — SIGNIFICANT CHANGE UP (ref 135–146)
WBC # BLD: 4.79 K/UL — LOW (ref 4.8–10.8)
WBC # FLD AUTO: 4.79 K/UL — LOW (ref 4.8–10.8)

## 2025-05-18 PROCEDURE — 99232 SBSQ HOSP IP/OBS MODERATE 35: CPT

## 2025-05-18 RX ADMIN — ESCITALOPRAM OXALATE 20 MILLIGRAM(S): 20 TABLET ORAL at 11:00

## 2025-05-18 RX ADMIN — Medication 1 TABLET(S): at 11:02

## 2025-05-18 RX ADMIN — Medication 400 MILLIGRAM(S): at 22:02

## 2025-05-18 RX ADMIN — ENOXAPARIN SODIUM 40 MILLIGRAM(S): 100 INJECTION SUBCUTANEOUS at 21:03

## 2025-05-18 RX ADMIN — Medication 0.5 MILLIGRAM(S): at 05:09

## 2025-05-18 RX ADMIN — Medication 0.5 MILLIGRAM(S): at 01:33

## 2025-05-18 RX ADMIN — Medication 0.5 MILLIGRAM(S): at 17:01

## 2025-05-18 RX ADMIN — FOLIC ACID 1 MILLIGRAM(S): 1 TABLET ORAL at 11:02

## 2025-05-18 RX ADMIN — Medication 0.5 MILLIGRAM(S): at 09:51

## 2025-05-18 RX ADMIN — Medication 0.5 MILLIGRAM(S): at 13:28

## 2025-05-18 RX ADMIN — Medication 400 MILLIGRAM(S): at 21:02

## 2025-05-18 NOTE — PROGRESS NOTE ADULT - ASSESSMENT
56 y/o female PMHx of MS, and alcohol dependence for almost two years.  Patient states she intermittently drinks 1/2 pint vodka and has increased dependence  w/ daily drinking for approx 2 months.   Patient states over past two weeks has developed tremors, denies black outs and denies w/d seizures.     # ETOH Dependence  - s/p Librium in ED  - ativan protocol  - Atarax PRN  - Thiamine & Folate   - Addiction medicine following  - Long term rehab     #Transaminitis, worsening  - Hepatocellular pattern  - Hepatitis panel negative  - RUQ US shows Hepatomegaly and diffusely echogenic liver, as may be seen with hepatic steatosis.  - No pain or other clinical signs of cholecystitis   - Avoid hepatotoxic agents  - If LFTs continue to uptrend consider GI consult    # Hypomagnesemia  - IV Mg replaced in ED  - repeat Mg level at 2200  - continue PO Mg     #MS, stable  - asymptomatic  - OP f/u  - Not currently on DMARDs    Handoff: completes detox taper 5/18

## 2025-05-18 NOTE — PROGRESS NOTE ADULT - SUBJECTIVE AND OBJECTIVE BOX
Pt interviewed, examined and EMR chart reviewed.    Follow up of Alcohol Dependency. Pt is on Ativan protocol. Pt is doing better . Pt with complaint of mild withdrawal. Pt with concern over getting her MS steroids treatment          MEDICATIONS  (STANDING):  enoxaparin Injectable 40 milliGRAM(s) SubCutaneous every 24 hours  escitalopram 20 milliGRAM(s) Oral daily  folic acid 1 milliGRAM(s) Oral daily  lactated ringers. 1000 milliLiter(s) (80 mL/Hr) IV Continuous <Continuous>  LORazepam     Tablet   Oral   LORazepam     Tablet 1.5 milliGRAM(s) Oral every 4 hours  LORazepam     Tablet 1 milliGRAM(s) Oral every 4 hours  multivitamin/minerals 1 Tablet(s) Oral daily  thiamine 100 milliGRAM(s) Oral daily    MEDICATIONS  (PRN):  aluminum hydroxide/magnesium hydroxide/simethicone Suspension 30 milliLiter(s) Oral every 4 hours PRN Dyspepsia  bismuth subsalicylate Liquid 30 milliLiter(s) Oral every 6 hours PRN Diarrhea  hydrOXYzine hydrochloride 50 milliGRAM(s) Oral every 6 hours PRN Anxiety  hydrOXYzine hydrochloride 100 milliGRAM(s) Oral at bedtime PRN insomnia  ibuprofen  Tablet. 400 milliGRAM(s) Oral every 6 hours PRN Mild Pain (1 - 3)  LORazepam     Tablet 2 milliGRAM(s) Oral every 4 hours PRN Alcohol withdrawal symptoms  magnesium hydroxide Suspension 30 milliLiter(s) Oral once PRN Constipation  melatonin 3 milliGRAM(s) Oral at bedtime PRN Insomnia  ondansetron   Disintegrating Tablet 4 milliGRAM(s) Oral every 6 hours PRN Nausea and/or Vomiting  ondansetron Injectable 4 milliGRAM(s) IV Push every 8 hours PRN Nausea and/or Vomiting      Vital Signs Last 24 Hrs  T(C): 36.6 (16 May 2025 04:48), Max: 36.8 (15 May 2025 21:03)  T(F): 97.9 (16 May 2025 04:48), Max: 98.3 (15 May 2025 21:03)  HR: 90 (16 May 2025 04:48) (90 - 102)  BP: 120/76 (16 May 2025 04:48) (120/76 - 146/78)  BP(mean): --  RR: 18 (16 May 2025 04:48) (18 - 18)  SpO2: 99% (16 May 2025 04:48) (98% - 99%)    Parameters below as of 16 May 2025 04:48  Patient On (Oxygen Delivery Method): room air        PHYSICAL EXAM:    Constitutional: NAD,   HEENT: EOMI, Normal Hearing,  Respiratory: CTAB/L  Cardiovascular: RR  Gastrointestinal: soft, NT/ND  Extremities: No peripheral edema  Neurological: A/O x 3, no focal deficits    LABS:                        12.6   4.36  )-----------( 96       ( 16 May 2025 07:48 )             37.0         139  |  102  |  10  ----------------------------<  95  4.2   |  26  |  0.5[L]    Ca    8.3[L]      16 May 2025 07:48  Mg     1.6         TPro  4.8[L]  /  Alb  3.1[L]  /  TBili  1.0  /  DBili  x   /  AST  145[H]  /  ALT  62[H]  /  AlkPhos  104      PT/INR - ( 15 May 2025 07:37 )   PT: 11.20 sec;   INR: 0.95 ratio           Urinalysis Basic - ( 16 May 2025 09:00 )    Color: Dark Yellow / Appearance: Cloudy / S.010 / pH: x  Gluc: x / Ketone: x  / Bili: Small / Urobili: 2.0 mg/dL   Blood: x / Protein: Negative mg/dL / Nitrite: Negative   Leuk Esterase: Small / RBC: x / WBC x   Sq Epi: x / Non Sq Epi: x / Bacteria: x      Drug Screen Urine:  Alcohol Level  Alcohol, Blood: 59 mg/dL (25 @ 16:05)        RADIOLOGY & ADDITIONAL STUDIES:  
Hospital Day:  4d    Subjective:    Patient is a 55y old  Female who presents with a chief complaint of ETOH dependence (17 May 2025 14:44)    This morning patient is lying in bed comfortably. he reports no new complaints, including SOB, chest pain, abdominal pain, nausea, vomiting, dysuria, constipation, or diarrhea.      Past Medical Hx:   Multiple sclerosis    Anxiety and depression      Past Sx:  H/O:       Allergies:  amoxicillin (Unknown)    Current Meds:   Standng Meds:  enoxaparin Injectable 40 milliGRAM(s) SubCutaneous every 24 hours  escitalopram 20 milliGRAM(s) Oral daily  folic acid 1 milliGRAM(s) Oral daily  lactated ringers. 1000 milliLiter(s) (80 mL/Hr) IV Continuous <Continuous>  LORazepam     Tablet   Oral   LORazepam     Tablet 0.5 milliGRAM(s) Oral every 4 hours  multivitamin/minerals 1 Tablet(s) Oral daily    PRN Meds:  aluminum hydroxide/magnesium hydroxide/simethicone Suspension 30 milliLiter(s) Oral every 4 hours PRN Dyspepsia  bismuth subsalicylate Liquid 30 milliLiter(s) Oral every 6 hours PRN Diarrhea  hydrOXYzine hydrochloride 50 milliGRAM(s) Oral every 6 hours PRN Anxiety  hydrOXYzine hydrochloride 100 milliGRAM(s) Oral at bedtime PRN insomnia  ibuprofen  Tablet. 400 milliGRAM(s) Oral every 6 hours PRN Mild Pain (1 - 3)  LORazepam     Tablet 2 milliGRAM(s) Oral every 4 hours PRN Alcohol withdrawal symptoms  magnesium hydroxide Suspension 30 milliLiter(s) Oral once PRN Constipation  melatonin 3 milliGRAM(s) Oral at bedtime PRN Insomnia  ondansetron   Disintegrating Tablet 4 milliGRAM(s) Oral every 6 hours PRN Nausea and/or Vomiting  ondansetron Injectable 4 milliGRAM(s) IV Push every 8 hours PRN Nausea and/or Vomiting    HOME MEDICATIONS:  Lexapro 20 mg oral tablet: 1 tab(s) orally once a day      Vital Signs:   T(F): 97.6 (25 @ 04:33), Max: 98.1 (25 @ 14:14)  HR: 79 (25 @ 04:33) (79 - 92)  BP: 125/80 (25 @ 04:33) (125/80 - 142/88)  RR: 18 (25 @ 04:33) (18 - 18)  SpO2: 99% (25 @ 04:33) (99% - 100%)        Physical Exam:   GENERAL: NAD  HEENT: NCAT  CHEST/LUNG: CTAB  HEART: Regular rate and rhythm; s1 s2 appreciated, No murmurs, rubs, or gallops  ABDOMEN: Soft, Nontender, Nondistended; Bowel sounds present  EXTREMITIES: No LE edema b/l  SKIN: no rashes, no new lesions  NERVOUS SYSTEM:  Alert & Oriented X3  LINES/CATHETERS:        Labs:                         13.2   4.33  )-----------( 111      ( 17 May 2025 08:15 )             38.9       17 May 2025 08:15    140    |  103    |  6      ----------------------------<  123    4.0     |  23     |  0.6      Ca    9.1        17 May 2025 08:15  Mg     1.7       17 May 2025 08:15    TPro  5.2    /  Alb  3.1    /  TBili  0.8    /  DBili  x      /  AST  246    /  ALT  110    /  AlkPhos  113    17 May 2025 08:15        Amylase 27, Lipase --, 05-15-25 @ 07:37              Urinalysis Basic - ( 17 May 2025 08:15 )    Color: x / Appearance: x / SG: x / pH: x  Gluc: 123 mg/dL / Ketone: x  / Bili: x / Urobili: x   Blood: x / Protein: x / Nitrite: x   Leuk Esterase: x / RBC: x / WBC x   Sq Epi: x / Non Sq Epi: x / Bacteria: x                Assessment and Plan:   
RON BROCK 55y Female  MRN#: [id number]     SUBJECTIVE  Patient is a 55y old Female who presents with a chief complaint of ETOH dependence (15 May 2025 10:51)  Currently admitted to medicine with the primary diagnosis of Alcoholism              OBJECTIVE  PAST MEDICAL & SURGICAL HISTORY  Multiple sclerosis    Anxiety and depression    H/O:       ALLERGIES:  amoxicillin (Unknown)    MEDICATIONS:  STANDING MEDICATIONS  enoxaparin Injectable 40 milliGRAM(s) SubCutaneous every 24 hours  escitalopram 20 milliGRAM(s) Oral daily  folic acid 1 milliGRAM(s) Oral daily  lactated ringers. 1000 milliLiter(s) IV Continuous <Continuous>  LORazepam     Tablet   Oral   LORazepam     Tablet 2 milliGRAM(s) Oral every 4 hours  LORazepam     Tablet 1.5 milliGRAM(s) Oral every 4 hours  multivitamin/minerals 1 Tablet(s) Oral daily  thiamine 100 milliGRAM(s) Oral daily    PRN MEDICATIONS  aluminum hydroxide/magnesium hydroxide/simethicone Suspension 30 milliLiter(s) Oral every 4 hours PRN  bismuth subsalicylate Liquid 30 milliLiter(s) Oral every 6 hours PRN  hydrOXYzine hydrochloride 50 milliGRAM(s) Oral every 6 hours PRN  hydrOXYzine hydrochloride 100 milliGRAM(s) Oral at bedtime PRN  ibuprofen  Tablet. 400 milliGRAM(s) Oral every 6 hours PRN  LORazepam     Tablet 2 milliGRAM(s) Oral every 4 hours PRN  magnesium hydroxide Suspension 30 milliLiter(s) Oral once PRN  melatonin 3 milliGRAM(s) Oral at bedtime PRN  ondansetron   Disintegrating Tablet 4 milliGRAM(s) Oral every 6 hours PRN  ondansetron Injectable 4 milliGRAM(s) IV Push every 8 hours PRN      ICU Vital Signs Last 24 Hrs  T(C): 36.7 (17 May 2025 14:14), Max: 36.8 (16 May 2025 20:18)  T(F): 98.1 (17 May 2025 14:14), Max: 98.2 (16 May 2025 20:18)  HR: 83 (17 May 2025 14:14) (83 - 89)  BP: 126/77 (17 May 2025 14:14) (126/77 - 143/79)  RR: 18 (17 May 2025 14:14) (18 - 18)  SpO2: 99% (17 May 2025 14:14) (97% - 99%)                            13.2   4.33  )-----------( 111      ( 17 May 2025 08:15 )             38.9           140  |  103  |  6[L]  ----------------------------<  123[H]  4.0   |  23  |  0.6[L]    Ca    9.1      17 May 2025 08:15  Mg     1.7         TPro  5.2[L]  /  Alb  3.1[L]  /  TBili  0.8  /  DBili  x   /  AST  246[H]  /  ALT  110[H]  /  AlkPhos  113                Urinalysis Basic - ( 17 May 2025 08:15 )    Color: x / Appearance: x / SG: x / pH: x  Gluc: 123 mg/dL / Ketone: x  / Bili: x / Urobili: x   Blood: x / Protein: x / Nitrite: x   Leuk Esterase: x / RBC: x / WBC x   Sq Epi: x / Non Sq Epi: x / Bacteria: x            Lactate Trend            CAPILLARY BLOOD GLUCOSE                  PHYSICAL EXAM:    GENERAL: NAD, well-developed  HEENT:  Atraumatic, Normocephalic  PULMONARY: Clear to auscultation bilaterally  CARDIOVASCULAR: Regular rate and rhythm  GASTROINTESTINAL: Soft, Nontender, Nondistended; Bowel sounds present  MUSCULOSKELETAL:  2+ Peripheral Pulses, No clubbing, cyanosis, or edema  NEUROLOGY: non-focal  SKIN: No rashes or lesions      
RON BROCK 55y Female  MRN#: [id number]     SUBJECTIVE  Patient is a 55y old Female who presents with a chief complaint of ETOH dependence (15 May 2025 10:51)  Currently admitted to medicine with the primary diagnosis of Alcoholism              OBJECTIVE  PAST MEDICAL & SURGICAL HISTORY  Multiple sclerosis    Anxiety and depression    H/O:       ALLERGIES:  amoxicillin (Unknown)    MEDICATIONS:  STANDING MEDICATIONS  enoxaparin Injectable 40 milliGRAM(s) SubCutaneous every 24 hours  escitalopram 20 milliGRAM(s) Oral daily  folic acid 1 milliGRAM(s) Oral daily  lactated ringers. 1000 milliLiter(s) IV Continuous <Continuous>  LORazepam     Tablet   Oral   LORazepam     Tablet 2 milliGRAM(s) Oral every 4 hours  LORazepam     Tablet 1.5 milliGRAM(s) Oral every 4 hours  multivitamin/minerals 1 Tablet(s) Oral daily  thiamine 100 milliGRAM(s) Oral daily    PRN MEDICATIONS  aluminum hydroxide/magnesium hydroxide/simethicone Suspension 30 milliLiter(s) Oral every 4 hours PRN  bismuth subsalicylate Liquid 30 milliLiter(s) Oral every 6 hours PRN  hydrOXYzine hydrochloride 50 milliGRAM(s) Oral every 6 hours PRN  hydrOXYzine hydrochloride 100 milliGRAM(s) Oral at bedtime PRN  ibuprofen  Tablet. 400 milliGRAM(s) Oral every 6 hours PRN  LORazepam     Tablet 2 milliGRAM(s) Oral every 4 hours PRN  magnesium hydroxide Suspension 30 milliLiter(s) Oral once PRN  melatonin 3 milliGRAM(s) Oral at bedtime PRN  ondansetron   Disintegrating Tablet 4 milliGRAM(s) Oral every 6 hours PRN  ondansetron Injectable 4 milliGRAM(s) IV Push every 8 hours PRN      VITAL SIGNS: Last 24 Hours  T(C): 36.7 (15 May 2025 04:37), Max: 36.8 (14 May 2025 18:33)  T(F): 98.1 (15 May 2025 04:37), Max: 98.3 (14 May 2025 18:33)  HR: 94 (15 May 2025 04:37) (76 - 94)  BP: 130/83 (15 May 2025 04:37) (114/69 - 135/79)  BP(mean): --  RR: 18 (15 May 2025 04:37) (18 - 20)  SpO2: 98% (15 May 2025 04:37) (97% - 100%)    LABS:                        13.2   4.85  )-----------( 114      ( 15 May 2025 07:37 )             37.7     05-15    135  |  98  |  10  ----------------------------<  88  4.0   |  26  |  0.6[L]    Ca    8.8      15 May 2025 07:37  Mg     2.1     05-15    TPro  4.9[L]  /  Alb  3.3[L]  /  TBili  1.8[H]  /  DBili  x   /  AST  88[H]  /  ALT  51[H]  /  AlkPhos  108  15    PT/INR - ( 15 May 2025 07:37 )   PT: 11.20 sec;   INR: 0.95 ratio           Urinalysis Basic - ( 15 May 2025 07:37 )    Color: x / Appearance: x / SG: x / pH: x  Gluc: 88 mg/dL / Ketone: x  / Bili: x / Urobili: x   Blood: x / Protein: x / Nitrite: x   Leuk Esterase: x / RBC: x / WBC x   Sq Epi: x / Non Sq Epi: x / Bacteria: x                RADIOLOGY:      PHYSICAL EXAM:    GENERAL: NAD, well-developed  HEENT:  Atraumatic, Normocephalic  PULMONARY: Clear to auscultation bilaterally  CARDIOVASCULAR: Regular rate and rhythm  GASTROINTESTINAL: Soft, Nontender, Nondistended; Bowel sounds present  MUSCULOSKELETAL:  2+ Peripheral Pulses, No clubbing, cyanosis, or edema  NEUROLOGY: non-focal  SKIN: No rashes or lesions      
RON BROCK 55y Female  MRN#: [id number]   CODE STATUS:________      SUBJECTIVE  Patient is a 55y old Female who presents with a chief complaint of ETOH dependence (15 May 2025 10:51)  Currently admitted to medicine with the primary diagnosis of Alcoholism      Today is hospital day 1d, and this morning she is           OBJECTIVE  PAST MEDICAL & SURGICAL HISTORY  Multiple sclerosis    Anxiety and depression    H/O:       ALLERGIES:  amoxicillin (Unknown)    MEDICATIONS:  STANDING MEDICATIONS  enoxaparin Injectable 40 milliGRAM(s) SubCutaneous every 24 hours  escitalopram 20 milliGRAM(s) Oral daily  folic acid 1 milliGRAM(s) Oral daily  lactated ringers. 1000 milliLiter(s) IV Continuous <Continuous>  LORazepam     Tablet   Oral   LORazepam     Tablet 2 milliGRAM(s) Oral every 4 hours  LORazepam     Tablet 1.5 milliGRAM(s) Oral every 4 hours  multivitamin/minerals 1 Tablet(s) Oral daily  thiamine 100 milliGRAM(s) Oral daily    PRN MEDICATIONS  aluminum hydroxide/magnesium hydroxide/simethicone Suspension 30 milliLiter(s) Oral every 4 hours PRN  bismuth subsalicylate Liquid 30 milliLiter(s) Oral every 6 hours PRN  hydrOXYzine hydrochloride 50 milliGRAM(s) Oral every 6 hours PRN  hydrOXYzine hydrochloride 100 milliGRAM(s) Oral at bedtime PRN  ibuprofen  Tablet. 400 milliGRAM(s) Oral every 6 hours PRN  LORazepam     Tablet 2 milliGRAM(s) Oral every 4 hours PRN  magnesium hydroxide Suspension 30 milliLiter(s) Oral once PRN  melatonin 3 milliGRAM(s) Oral at bedtime PRN  ondansetron   Disintegrating Tablet 4 milliGRAM(s) Oral every 6 hours PRN  ondansetron Injectable 4 milliGRAM(s) IV Push every 8 hours PRN      VITAL SIGNS: Last 24 Hours  T(C): 36.7 (15 May 2025 04:37), Max: 36.8 (14 May 2025 18:33)  T(F): 98.1 (15 May 2025 04:37), Max: 98.3 (14 May 2025 18:33)  HR: 94 (15 May 2025 04:37) (76 - 94)  BP: 130/83 (15 May 2025 04:37) (114/69 - 135/79)  BP(mean): --  RR: 18 (15 May 2025 04:37) (18 - 20)  SpO2: 98% (15 May 2025 04:37) (97% - 100%)    LABS:                        13.2   4.85  )-----------( 114      ( 15 May 2025 07:37 )             37.7     05-15    135  |  98  |  10  ----------------------------<  88  4.0   |  26  |  0.6[L]    Ca    8.8      15 May 2025 07:37  Mg     2.1     05-15    TPro  4.9[L]  /  Alb  3.3[L]  /  TBili  1.8[H]  /  DBili  x   /  AST  88[H]  /  ALT  51[H]  /  AlkPhos  108  05-15    PT/INR - ( 15 May 2025 07:37 )   PT: 11.20 sec;   INR: 0.95 ratio           Urinalysis Basic - ( 15 May 2025 07:37 )    Color: x / Appearance: x / SG: x / pH: x  Gluc: 88 mg/dL / Ketone: x  / Bili: x / Urobili: x   Blood: x / Protein: x / Nitrite: x   Leuk Esterase: x / RBC: x / WBC x   Sq Epi: x / Non Sq Epi: x / Bacteria: x                RADIOLOGY:      PHYSICAL EXAM:    GENERAL: NAD, well-developed  HEENT:  Atraumatic, Normocephalic. EOMI, PERRLA, conjunctiva and sclera clear, No JVD  PULMONARY: Clear to auscultation bilaterally; No wheeze  CARDIOVASCULAR: Regular rate and rhythm; No murmurs, rubs, or gallops  GASTROINTESTINAL: Soft, Nontender, Nondistended; Bowel sounds present  MUSCULOSKELETAL:  2+ Peripheral Pulses, No clubbing, cyanosis, or edema  NEUROLOGY: non-focal  SKIN: No rashes or lesions

## 2025-05-19 ENCOUNTER — TRANSCRIPTION ENCOUNTER (OUTPATIENT)
Age: 55
End: 2025-05-19

## 2025-05-19 VITALS
TEMPERATURE: 98 F | OXYGEN SATURATION: 99 % | RESPIRATION RATE: 18 BRPM | HEART RATE: 75 BPM | DIASTOLIC BLOOD PRESSURE: 81 MMHG | SYSTOLIC BLOOD PRESSURE: 129 MMHG

## 2025-05-19 DIAGNOSIS — F39 UNSPECIFIED MOOD [AFFECTIVE] DISORDER: ICD-10-CM

## 2025-05-19 LAB
ALBUMIN SERPL ELPH-MCNC: 3.6 G/DL — SIGNIFICANT CHANGE UP (ref 3.5–5.2)
ALP SERPL-CCNC: 118 U/L — HIGH (ref 30–115)
ALT FLD-CCNC: 138 U/L — HIGH (ref 0–41)
ANA TITR SER: NEGATIVE — SIGNIFICANT CHANGE UP
ANION GAP SERPL CALC-SCNC: 12 MMOL/L — SIGNIFICANT CHANGE UP (ref 7–14)
AST SERPL-CCNC: 183 U/L — HIGH (ref 0–41)
BASOPHILS # BLD AUTO: 0.03 K/UL — SIGNIFICANT CHANGE UP (ref 0–0.2)
BASOPHILS NFR BLD AUTO: 0.7 % — SIGNIFICANT CHANGE UP (ref 0–1)
BILIRUB SERPL-MCNC: 0.7 MG/DL — SIGNIFICANT CHANGE UP (ref 0.2–1.2)
BUN SERPL-MCNC: 5 MG/DL — LOW (ref 10–20)
CALCIUM SERPL-MCNC: 9 MG/DL — SIGNIFICANT CHANGE UP (ref 8.4–10.5)
CHLORIDE SERPL-SCNC: 104 MMOL/L — SIGNIFICANT CHANGE UP (ref 98–110)
CO2 SERPL-SCNC: 28 MMOL/L — SIGNIFICANT CHANGE UP (ref 17–32)
CREAT SERPL-MCNC: 0.7 MG/DL — SIGNIFICANT CHANGE UP (ref 0.7–1.5)
EGFR: 101 ML/MIN/1.73M2 — SIGNIFICANT CHANGE UP
EGFR: 101 ML/MIN/1.73M2 — SIGNIFICANT CHANGE UP
EOSINOPHIL # BLD AUTO: 0.18 K/UL — SIGNIFICANT CHANGE UP (ref 0–0.7)
EOSINOPHIL NFR BLD AUTO: 4.1 % — SIGNIFICANT CHANGE UP (ref 0–8)
GLUCOSE SERPL-MCNC: 79 MG/DL — SIGNIFICANT CHANGE UP (ref 70–99)
HCT VFR BLD CALC: 41.9 % — SIGNIFICANT CHANGE UP (ref 37–47)
HGB BLD-MCNC: 14.2 G/DL — SIGNIFICANT CHANGE UP (ref 12–16)
IMM GRANULOCYTES NFR BLD AUTO: 0.9 % — HIGH (ref 0.1–0.3)
LYMPHOCYTES # BLD AUTO: 1.84 K/UL — SIGNIFICANT CHANGE UP (ref 1.2–3.4)
LYMPHOCYTES # BLD AUTO: 41.7 % — SIGNIFICANT CHANGE UP (ref 20.5–51.1)
MCHC RBC-ENTMCNC: 33.9 G/DL — SIGNIFICANT CHANGE UP (ref 32–37)
MCHC RBC-ENTMCNC: 39 PG — HIGH (ref 27–31)
MCV RBC AUTO: 115.1 FL — HIGH (ref 81–99)
MONOCYTES # BLD AUTO: 0.37 K/UL — SIGNIFICANT CHANGE UP (ref 0.1–0.6)
MONOCYTES NFR BLD AUTO: 8.4 % — SIGNIFICANT CHANGE UP (ref 1.7–9.3)
NEUTROPHILS # BLD AUTO: 1.95 K/UL — SIGNIFICANT CHANGE UP (ref 1.4–6.5)
NEUTROPHILS NFR BLD AUTO: 44.2 % — SIGNIFICANT CHANGE UP (ref 42.2–75.2)
NRBC BLD AUTO-RTO: 0 /100 WBCS — SIGNIFICANT CHANGE UP (ref 0–0)
PLATELET # BLD AUTO: 121 K/UL — LOW (ref 130–400)
PMV BLD: 8.7 FL — SIGNIFICANT CHANGE UP (ref 7.4–10.4)
POTASSIUM SERPL-MCNC: 4.3 MMOL/L — SIGNIFICANT CHANGE UP (ref 3.5–5)
POTASSIUM SERPL-SCNC: 4.3 MMOL/L — SIGNIFICANT CHANGE UP (ref 3.5–5)
PROT SERPL-MCNC: 5.9 G/DL — LOW (ref 6–8)
RBC # BLD: 3.64 M/UL — LOW (ref 4.2–5.4)
RBC # FLD: 14.1 % — SIGNIFICANT CHANGE UP (ref 11.5–14.5)
SODIUM SERPL-SCNC: 144 MMOL/L — SIGNIFICANT CHANGE UP (ref 135–146)
WBC # BLD: 4.41 K/UL — LOW (ref 4.8–10.8)
WBC # FLD AUTO: 4.41 K/UL — LOW (ref 4.8–10.8)

## 2025-05-19 PROCEDURE — 99239 HOSP IP/OBS DSCHRG MGMT >30: CPT

## 2025-05-19 RX ORDER — HYDROXYZINE HYDROCHLORIDE 25 MG/1
1 TABLET, FILM COATED ORAL
Qty: 28 | Refills: 0
Start: 2025-05-19 | End: 2025-05-25

## 2025-05-19 RX ORDER — FOLIC ACID 1 MG/1
1 TABLET ORAL
Qty: 14 | Refills: 0
Start: 2025-05-19 | End: 2025-06-01

## 2025-05-19 RX ADMIN — Medication 1 TABLET(S): at 11:32

## 2025-05-19 RX ADMIN — FOLIC ACID 1 MILLIGRAM(S): 1 TABLET ORAL at 11:31

## 2025-05-19 RX ADMIN — ESCITALOPRAM OXALATE 20 MILLIGRAM(S): 20 TABLET ORAL at 11:31

## 2025-05-19 NOTE — DISCHARGE NOTE PROVIDER - NSDCFUSCHEDAPPT_GEN_ALL_CORE_FT
04-02    135  |  99  |  14  ----------------------------<  142<H>  3.6   |  25  |  <0.30<L>    Ca    9.5      02 Apr 2024 23:40  Phos  3.1     04-02  Mg     1.9     04-02    TPro  7.1  /  Alb  3.5  /  TBili  0.6  /  DBili  x   /  AST  17  /  ALT  20  /  AlkPhos  49  04-02  POCT Blood Glucose.: 172 mg/dL (04-04-24 @ 05:37)  A1C with Estimated Average Glucose Result: 7.5 % (10-28-23 @ 07:18) Camryn Larson  [facility] PreAdmits  Scheduled Appointment: 05/20/2025    [facility] Physician [facility]  PSYCHIATRY [facility] Tab  Scheduled Appointment: 05/20/2025

## 2025-05-19 NOTE — DISCHARGE NOTE PROVIDER - ATTENDING DISCHARGE PHYSICAL EXAMINATION:
PHYSICAL EXAM:  GENERAL: NAD, lying in bed comfortably  HEAD:  Atraumatic, Normocephalic  EYES: EOMI, PERRLA, conjunctiva and sclera clear  ENT: Moist mucous membranes  NECK: Supple, No JVD  CHEST/LUNG: Clear to auscultation bilaterally; No rales, rhonchi, wheezing, or rubs. Unlabored respirations  HEART: Regular rate and rhythm; No murmurs, rubs, or gallops  ABDOMEN: Bowel sounds present; Soft, Nontender, Nondistended. No hepatomegally  EXTREMITIES: WARM   NERVOUS SYSTEM:  Alert & Oriented X3, speech clear. No deficits   s

## 2025-05-19 NOTE — DISCHARGE NOTE NURSING/CASE MANAGEMENT/SOCIAL WORK - NSDCVIVACCINE_GEN_ALL_CORE_FT
Tdap; 29-Nov-2020 12:50; Angela Quevedo (INGE); [employer]; [id number] (Exp. Date: 31-Jul-2022); IntraMuscular; Deltoid Left.; 0.5 milliLiter(s); VIS (VIS Published: 09-May-2013, VIS Presented: 29-Nov-2020);

## 2025-05-19 NOTE — DISCHARGE NOTE PROVIDER - CARE PROVIDER_API CALL
Fernando Miranda  Internal Medicine  [address] [place] [address]  Phone: [phone]  Fax: [phone]  Follow Up Time:

## 2025-05-19 NOTE — DISCHARGE NOTE NURSING/CASE MANAGEMENT/SOCIAL WORK - PATIENT PORTAL LINK FT
You can access the FollowMyHealth Patient Portal offered by [facility] by registering at the following website: [email or web address]. By joining [employer]’s FollowMyHealth portal, you will also be able to view your health information using other applications (apps) compatible with our system.

## 2025-05-19 NOTE — DISCHARGE NOTE PROVIDER - NSDCCPCAREPLAN_GEN_ALL_CORE_FT
PRINCIPAL DISCHARGE DIAGNOSIS  Diagnosis: Alcohol dependence with withdrawal  Assessment and Plan of Treatment: completed detox   addiction / catch team followed   recommended naltrexone however contraindicated given elevated liver enzymes ; follow up with outpatient program tomorrow 5/19 as scheduled   - TAKE THIAMINE AND FOLATE DAILY      SECONDARY DISCHARGE DIAGNOSES  Diagnosis: Hypomagnesemia  Assessment and Plan of Treatment: improved  Magnesium 1.7  recommend outpatient follow up with primary care and over the counter magnesium supplement    Diagnosis: Abnormal transaminases  Assessment and Plan of Treatment: liver enzymes elevated but improved  on discharge / /    RUQ sonogram showed - 1.  Hepatomegaly.  2.  Diffusely echogenic liver, as may be seen with hepatic steatosis.  3.  Cholelithiasis and gallbladder wall thickening without additional   ultrasound findings for cholecystitis: Clinical correlation recommended.    If there remains concern for cholecystitis, nuclear medicine scintigraphy   may be helpful here.  - cholecystitis ruled out   - liver enzymes improving  - Abdominal ultrasound AFP every 6 months for HCC screening  -EGD outpatient for esophageal varices screening   -Follow up with Private GI or our [facility] located at [address] [place] [address]. Phone Number: [phone]  -Alcohol counseling provided

## 2025-05-19 NOTE — DISCHARGE NOTE NURSING/CASE MANAGEMENT/SOCIAL WORK - FINANCIAL ASSISTANCE
[facility] provides services at a reduced cost to those who are determined to be eligible through [facility]’s financial assistance program. Information regarding [facility]’s financial assistance program can be found by going to [email or web address] or by calling [phone].

## 2025-05-19 NOTE — CHART NOTE - NSCHARTNOTEFT_GEN_A_CORE
Patient requires a rollator walker for d/c to home due to her Multiple sclerosis causing a mobility deficiency that can be resolved with the use of a rollator. Patient is able to safely use rollator.
To Whom it may concern,     Nazia Coburn (: 1970) was admitted to [facility] on 25. She remained admitted until 25. Please excuse Nazia's absence from work on these days while she was admitted here in the Hospital. For continued to recovery, we recommend she remain out of work until 25 to allow for continued recover. She may return to work in full capacity afterwards with no restrictions.     If you have any questions, please do no hesitate to call [phone].       Sincerely,   [facility] Hospitalist Team

## 2025-05-19 NOTE — DISCHARGE NOTE PROVIDER - NSDCMRMEDTOKEN_GEN_ALL_CORE_FT
folic acid 1 mg oral tablet: 1 tab(s) orally once a day  hydrOXYzine hydrochloride 50 mg oral tablet: 1 tab(s) orally every 6 hours as needed for Anxiety  Lexapro 20 mg oral tablet: 1 tab(s) orally once a day  thiamine 100 mg oral tablet: 1 tab(s) orally once a day

## 2025-05-20 ENCOUNTER — OUTPATIENT (OUTPATIENT)
Dept: OUTPATIENT SERVICES | Facility: HOSPITAL | Age: 55
LOS: 1 days | End: 2025-05-20
Payer: COMMERCIAL

## 2025-05-20 ENCOUNTER — APPOINTMENT (OUTPATIENT)
Dept: PSYCHIATRY | Facility: CLINIC | Age: 55
End: 2025-05-20

## 2025-05-20 DIAGNOSIS — F10.20 ALCOHOL DEPENDENCE, UNCOMPLICATED: ICD-10-CM

## 2025-05-20 DIAGNOSIS — Z98.891 HISTORY OF UTERINE SCAR FROM PREVIOUS SURGERY: Chronic | ICD-10-CM

## 2025-05-20 PROCEDURE — 90791 PSYCH DIAGNOSTIC EVALUATION: CPT

## 2025-05-21 DIAGNOSIS — F10.20 ALCOHOL DEPENDENCE, UNCOMPLICATED: ICD-10-CM

## 2025-05-24 DIAGNOSIS — G35 MULTIPLE SCLEROSIS: ICD-10-CM

## 2025-05-24 DIAGNOSIS — F41.9 ANXIETY DISORDER, UNSPECIFIED: ICD-10-CM

## 2025-05-24 DIAGNOSIS — R74.01 ELEVATION OF LEVELS OF LIVER TRANSAMINASE LEVELS: ICD-10-CM

## 2025-05-24 DIAGNOSIS — R16.0 HEPATOMEGALY, NOT ELSEWHERE CLASSIFIED: ICD-10-CM

## 2025-05-24 DIAGNOSIS — E83.42 HYPOMAGNESEMIA: ICD-10-CM
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
## 2025-05-24 DIAGNOSIS — F32.A DEPRESSION, UNSPECIFIED: ICD-10-CM

## 2025-05-24 DIAGNOSIS — F10.239 ALCOHOL DEPENDENCE WITH WITHDRAWAL, UNSPECIFIED: ICD-10-CM

## 2025-05-24 DIAGNOSIS — Z88.0 ALLERGY STATUS TO PENICILLIN: ICD-10-CM

## 2025-05-28 ENCOUNTER — APPOINTMENT (OUTPATIENT)
Dept: PSYCHIATRY | Facility: CLINIC | Age: 55
End: 2025-05-28

## 2025-05-28 ENCOUNTER — OUTPATIENT (OUTPATIENT)
Dept: OUTPATIENT SERVICES | Facility: HOSPITAL | Age: 55
LOS: 1 days | End: 2025-05-28
Payer: COMMERCIAL

## 2025-05-28 DIAGNOSIS — F10.20 ALCOHOL DEPENDENCE, UNCOMPLICATED: ICD-10-CM

## 2025-05-28 DIAGNOSIS — Z98.891 HISTORY OF UTERINE SCAR FROM PREVIOUS SURGERY: Chronic | ICD-10-CM

## 2025-05-28 PROCEDURE — 90837 PSYTX W PT 60 MINUTES: CPT

## 2025-05-29 DIAGNOSIS — F10.20 ALCOHOL DEPENDENCE, UNCOMPLICATED: ICD-10-CM

## 2025-06-04 ENCOUNTER — APPOINTMENT (OUTPATIENT)
Dept: PSYCHIATRY | Facility: CLINIC | Age: 55
End: 2025-06-04

## 2025-06-04 ENCOUNTER — OUTPATIENT (OUTPATIENT)
Dept: OUTPATIENT SERVICES | Facility: HOSPITAL | Age: 55
LOS: 1 days | End: 2025-06-04
Payer: COMMERCIAL

## 2025-06-04 DIAGNOSIS — Z98.891 HISTORY OF UTERINE SCAR FROM PREVIOUS SURGERY: Chronic | ICD-10-CM

## 2025-06-04 DIAGNOSIS — F10.20 ALCOHOL DEPENDENCE, UNCOMPLICATED: ICD-10-CM

## 2025-06-04 PROCEDURE — 90834 PSYTX W PT 45 MINUTES: CPT

## 2025-06-05 DIAGNOSIS — F10.20 ALCOHOL DEPENDENCE, UNCOMPLICATED: ICD-10-CM

## 2025-06-11 ENCOUNTER — APPOINTMENT (OUTPATIENT)
Dept: PSYCHIATRY | Facility: CLINIC | Age: 55
End: 2025-06-11

## 2025-06-11 ENCOUNTER — OUTPATIENT (OUTPATIENT)
Dept: OUTPATIENT SERVICES | Facility: HOSPITAL | Age: 55
LOS: 1 days | End: 2025-06-11
Payer: COMMERCIAL

## 2025-06-11 DIAGNOSIS — Z98.891 HISTORY OF UTERINE SCAR FROM PREVIOUS SURGERY: Chronic | ICD-10-CM

## 2025-06-11 DIAGNOSIS — F10.20 ALCOHOL DEPENDENCE, UNCOMPLICATED: ICD-10-CM

## 2025-06-11 PROCEDURE — 90834 PSYTX W PT 45 MINUTES: CPT

## 2025-06-12 ENCOUNTER — OUTPATIENT (OUTPATIENT)
Dept: OUTPATIENT SERVICES | Facility: HOSPITAL | Age: 55
LOS: 1 days | End: 2025-06-12
Payer: COMMERCIAL

## 2025-06-12 ENCOUNTER — APPOINTMENT (OUTPATIENT)
Dept: PSYCHIATRY | Facility: CLINIC | Age: 55
End: 2025-06-12
Payer: COMMERCIAL

## 2025-06-12 DIAGNOSIS — F10.20 ALCOHOL DEPENDENCE, UNCOMPLICATED: ICD-10-CM

## 2025-06-12 DIAGNOSIS — Z98.891 HISTORY OF UTERINE SCAR FROM PREVIOUS SURGERY: Chronic | ICD-10-CM

## 2025-06-12 PROCEDURE — 90792 PSYCH DIAG EVAL W/MED SRVCS: CPT

## 2025-06-13 DIAGNOSIS — F10.20 ALCOHOL DEPENDENCE, UNCOMPLICATED: ICD-10-CM

## 2025-06-24 ENCOUNTER — APPOINTMENT (OUTPATIENT)
Dept: PSYCHIATRY | Facility: CLINIC | Age: 55
End: 2025-06-24

## 2025-06-24 ENCOUNTER — OUTPATIENT (OUTPATIENT)
Dept: OUTPATIENT SERVICES | Facility: HOSPITAL | Age: 55
LOS: 1 days | End: 2025-06-24
Payer: COMMERCIAL

## 2025-06-24 DIAGNOSIS — F10.20 ALCOHOL DEPENDENCE, UNCOMPLICATED: ICD-10-CM

## 2025-06-24 DIAGNOSIS — Z98.891 HISTORY OF UTERINE SCAR FROM PREVIOUS SURGERY: Chronic | ICD-10-CM

## 2025-06-24 PROCEDURE — H0038: CPT | Mod: 95

## 2025-06-25 ENCOUNTER — APPOINTMENT (OUTPATIENT)
Dept: PSYCHIATRY | Facility: CLINIC | Age: 55
End: 2025-06-25

## 2025-06-25 ENCOUNTER — OUTPATIENT (OUTPATIENT)
Dept: OUTPATIENT SERVICES | Facility: HOSPITAL | Age: 55
LOS: 1 days | End: 2025-06-25
Payer: COMMERCIAL

## 2025-06-25 DIAGNOSIS — F10.20 ALCOHOL DEPENDENCE, UNCOMPLICATED: ICD-10-CM

## 2025-06-25 DIAGNOSIS — Z98.891 HISTORY OF UTERINE SCAR FROM PREVIOUS SURGERY: Chronic | ICD-10-CM

## 2025-06-25 PROCEDURE — 90832 PSYTX W PT 30 MINUTES: CPT

## 2025-06-26 DIAGNOSIS — F10.20 ALCOHOL DEPENDENCE, UNCOMPLICATED: ICD-10-CM

## 2025-07-14 ENCOUNTER — OUTPATIENT (OUTPATIENT)
Dept: OUTPATIENT SERVICES | Facility: HOSPITAL | Age: 55
LOS: 1 days | End: 2025-07-14
Payer: COMMERCIAL

## 2025-07-14 ENCOUNTER — APPOINTMENT (OUTPATIENT)
Dept: PSYCHIATRY | Facility: CLINIC | Age: 55
End: 2025-07-14

## 2025-07-14 DIAGNOSIS — Z98.891 HISTORY OF UTERINE SCAR FROM PREVIOUS SURGERY: Chronic | ICD-10-CM

## 2025-07-14 DIAGNOSIS — F10.20 ALCOHOL DEPENDENCE, UNCOMPLICATED: ICD-10-CM

## 2025-07-14 PROCEDURE — 90834 PSYTX W PT 45 MINUTES: CPT

## 2025-07-15 DIAGNOSIS — F10.20 ALCOHOL DEPENDENCE, UNCOMPLICATED: ICD-10-CM

## 2025-07-28 ENCOUNTER — APPOINTMENT (OUTPATIENT)
Dept: PSYCHIATRY | Facility: CLINIC | Age: 55
End: 2025-07-28

## 2025-07-28 ENCOUNTER — OUTPATIENT (OUTPATIENT)
Dept: OUTPATIENT SERVICES | Facility: HOSPITAL | Age: 55
LOS: 1 days | End: 2025-07-28
Payer: COMMERCIAL

## 2025-07-28 DIAGNOSIS — Z98.891 HISTORY OF UTERINE SCAR FROM PREVIOUS SURGERY: Chronic | ICD-10-CM

## 2025-07-28 DIAGNOSIS — F10.20 ALCOHOL DEPENDENCE, UNCOMPLICATED: ICD-10-CM

## 2025-07-28 PROCEDURE — 90834 PSYTX W PT 45 MINUTES: CPT

## 2025-07-29 DIAGNOSIS — F10.20 ALCOHOL DEPENDENCE, UNCOMPLICATED: ICD-10-CM

## 2025-08-18 ENCOUNTER — OUTPATIENT (OUTPATIENT)
Dept: OUTPATIENT SERVICES | Facility: HOSPITAL | Age: 55
LOS: 1 days | End: 2025-08-18
Payer: COMMERCIAL

## 2025-08-18 ENCOUNTER — APPOINTMENT (OUTPATIENT)
Dept: PSYCHIATRY | Facility: CLINIC | Age: 55
End: 2025-08-18

## 2025-08-18 DIAGNOSIS — Z98.891 HISTORY OF UTERINE SCAR FROM PREVIOUS SURGERY: Chronic | ICD-10-CM

## 2025-08-18 DIAGNOSIS — F10.20 ALCOHOL DEPENDENCE, UNCOMPLICATED: ICD-10-CM

## 2025-08-18 PROCEDURE — 90834 PSYTX W PT 45 MINUTES: CPT

## 2025-08-19 DIAGNOSIS — F10.20 ALCOHOL DEPENDENCE, UNCOMPLICATED: ICD-10-CM

## 2025-09-10 ENCOUNTER — APPOINTMENT (OUTPATIENT)
Dept: PSYCHIATRY | Facility: CLINIC | Age: 55
End: 2025-09-10

## 2025-09-11 ENCOUNTER — APPOINTMENT (OUTPATIENT)
Dept: PSYCHIATRY | Facility: CLINIC | Age: 55
End: 2025-09-11